# Patient Record
Sex: FEMALE | Race: WHITE | NOT HISPANIC OR LATINO | Employment: FULL TIME | URBAN - NONMETROPOLITAN AREA
[De-identification: names, ages, dates, MRNs, and addresses within clinical notes are randomized per-mention and may not be internally consistent; named-entity substitution may affect disease eponyms.]

---

## 2022-07-11 ENCOUNTER — OFFICE VISIT (OUTPATIENT)
Dept: FAMILY MEDICINE CLINIC | Facility: CLINIC | Age: 32
End: 2022-07-11

## 2022-07-11 VITALS
HEART RATE: 115 BPM | WEIGHT: 140 LBS | HEIGHT: 63 IN | RESPIRATION RATE: 15 BRPM | DIASTOLIC BLOOD PRESSURE: 80 MMHG | SYSTOLIC BLOOD PRESSURE: 130 MMHG | BODY MASS INDEX: 24.8 KG/M2 | TEMPERATURE: 98 F | OXYGEN SATURATION: 97 %

## 2022-07-11 DIAGNOSIS — L50.9 URTICARIA: ICD-10-CM

## 2022-07-11 DIAGNOSIS — N39.0 URINARY TRACT INFECTION WITHOUT HEMATURIA, SITE UNSPECIFIED: ICD-10-CM

## 2022-07-11 DIAGNOSIS — R30.0 DYSURIA: ICD-10-CM

## 2022-07-11 DIAGNOSIS — Z00.00 PHYSICAL EXAM: Primary | ICD-10-CM

## 2022-07-11 LAB
BILIRUB BLD-MCNC: NEGATIVE MG/DL
CLARITY, POC: ABNORMAL
COLOR UR: ABNORMAL
EXPIRATION DATE: ABNORMAL
GLUCOSE UR STRIP-MCNC: NEGATIVE MG/DL
KETONES UR QL: NEGATIVE
LEUKOCYTE EST, POC: ABNORMAL
Lab: ABNORMAL
NITRITE UR-MCNC: POSITIVE MG/ML
PH UR: 5.5 [PH] (ref 5–8)
PROT UR STRIP-MCNC: ABNORMAL MG/DL
RBC # UR STRIP: ABNORMAL /UL
SP GR UR: 1.02 (ref 1–1.03)
UROBILINOGEN UR QL: NORMAL

## 2022-07-11 PROCEDURE — 99385 PREV VISIT NEW AGE 18-39: CPT | Performed by: FAMILY MEDICINE

## 2022-07-11 PROCEDURE — 81003 URINALYSIS AUTO W/O SCOPE: CPT | Performed by: FAMILY MEDICINE

## 2022-07-11 PROCEDURE — 3008F BODY MASS INDEX DOCD: CPT | Performed by: FAMILY MEDICINE

## 2022-07-11 RX ORDER — NITROFURANTOIN 25; 75 MG/1; MG/1
100 CAPSULE ORAL 2 TIMES DAILY
Qty: 20 CAPSULE | Refills: 0 | Status: SHIPPED | OUTPATIENT
Start: 2022-07-11

## 2022-07-11 RX ORDER — PREDNISONE 10 MG/1
10 TABLET ORAL DAILY
Qty: 30 TABLET | Refills: 0 | Status: SHIPPED | OUTPATIENT
Start: 2022-07-11

## 2022-07-11 RX ORDER — NORETHINDRONE ACETATE AND ETHINYL ESTRADIOL AND FERROUS FUMARATE 1.5-30(21)
KIT ORAL
COMMUNITY
Start: 2022-05-11

## 2022-07-11 NOTE — ASSESSMENT & PLAN NOTE
Patient has a photo induced urticaria.  We will give her some prednisone.  She has an appoint see the dermatologist on the 18th.  I talked her at length about avoiding sunlight wearing sunscreen and wearing with close to cover all exposed skin.   Home

## 2022-07-15 LAB
BACTERIA UR CULT: ABNORMAL
BACTERIA UR CULT: ABNORMAL
OTHER ANTIBIOTIC SUSC ISLT: ABNORMAL

## 2022-07-18 ENCOUNTER — TELEPHONE (OUTPATIENT)
Dept: FAMILY MEDICINE CLINIC | Facility: CLINIC | Age: 32
End: 2022-07-18

## 2022-07-18 NOTE — TELEPHONE ENCOUNTER
referral to Dermatology does not take insurance can you find out who does and sent the referral to them

## 2022-07-18 NOTE — TELEPHONE ENCOUNTER
----- Message from Hipolito Orr MD sent at 7/15/2022  7:42 AM EDT -----  Please make sure pt  is taking antibiotics. Thanks

## 2022-08-16 ENCOUNTER — OFFICE VISIT (OUTPATIENT)
Dept: FAMILY MEDICINE CLINIC | Facility: CLINIC | Age: 32
End: 2022-08-16

## 2022-08-16 VITALS
SYSTOLIC BLOOD PRESSURE: 120 MMHG | RESPIRATION RATE: 12 BRPM | HEART RATE: 92 BPM | HEIGHT: 63 IN | OXYGEN SATURATION: 98 % | DIASTOLIC BLOOD PRESSURE: 80 MMHG | WEIGHT: 144.7 LBS | BODY MASS INDEX: 25.64 KG/M2 | TEMPERATURE: 97.7 F

## 2022-08-16 DIAGNOSIS — L50.9 URTICARIA: ICD-10-CM

## 2022-08-16 DIAGNOSIS — Z13.9 SCREENING DUE: ICD-10-CM

## 2022-08-16 DIAGNOSIS — R21 RASH: Primary | ICD-10-CM

## 2022-08-16 PROCEDURE — 99213 OFFICE O/P EST LOW 20 MIN: CPT | Performed by: FAMILY MEDICINE

## 2022-08-16 NOTE — PROGRESS NOTES
Patient Name: Svetlana Rizo  : 1990   MRN: [id number]     Chief Complaint:    Chief Complaint   Patient presents with   • referral      Pt is here for a referral to Dermatology, [facility] in [facility]         History of Present Illness: Svetlana Rizo is a 32 y.o. female who is here today for follow up on rash.  HPI        Review of Systems:   Review of Systems   Constitutional: Negative.    HENT: Negative.    Eyes: Negative.    Respiratory: Negative.    Cardiovascular: Negative.    Gastrointestinal: Negative.    Neurological: Negative.         Past Medical History:   Past Medical History:   Diagnosis Date   • Anxiety    • Hepatitis    • Neurotic depression        Past Surgical History: History reviewed. No pertinent surgical history.    Family History:   Family History   Problem Relation Age of Onset   • Asthma Mother    • Migraines Mother    • Depression Sister    • Diabetes Sister    • Migraines Sister    • Asthma Sister    • Mental illness Sister    • Seizures Sister    • Cancer Other    • Alcohol abuse Other    • Mental illness Other    • Depression Other    • Diabetes Other    • Asthma Other    • Osteoporosis Other    • Migraines Other    • Seizures Other    • Eczema Other        Social History:   Social History     Socioeconomic History   • Marital status: Single   Tobacco Use   • Smoking status: Current Every Day Smoker     Packs/day: 0.50     Years: 16.00     Pack years: 8.00     Start date:    • Smokeless tobacco: Never Used   Vaping Use   • Vaping Use: Some days   Substance and Sexual Activity   • Alcohol use: Not Currently   • Drug use: Not Currently   • Sexual activity: Defer       Medications:     Current Outpatient Medications:   •  Junel FE 1.5/30 1.5-30 MG-MCG tablet, , Disp: , Rfl:   •  nitrofurantoin, macrocrystal-monohydrate, (Macrobid) 100 MG capsule, Take 1 capsule by mouth 2 (Two) Times a Day., Disp: 20 capsule, Rfl: 0  •  predniSONE (DELTASONE) 10 MG tablet, Take 1 tablet  "by mouth Daily. 5 po once a day for 2 days, 4 po once a day for 2 days, 3 po once a day for 2 days, 2 po once a day for 2 days, 1 po once a day for 2 days, Disp: 30 tablet, Rfl: 0    Allergies:   No Known Allergies      Physical Exam:  Vital Signs:   Vitals:    08/16/22 1508   BP: 120/80   BP Location: Left arm   Patient Position: Sitting   Cuff Size: Adult   Pulse: 92   Resp: 12   Temp: 97.7 °F (36.5 °C)   TempSrc: Temporal   SpO2: 98%   Weight: 65.6 kg (144 lb 11.2 oz)   Height: 160 cm (63\")   PainSc: 0-No pain     Body mass index is 25.63 kg/m².     Physical Exam  Vitals and nursing note reviewed.   Constitutional:       Appearance: Normal appearance. She is normal weight.   HENT:      Head: Normocephalic and atraumatic.      Right Ear: Tympanic membrane, ear canal and external ear normal.      Left Ear: Tympanic membrane, ear canal and external ear normal.      Nose: Nose normal.      Mouth/Throat:      Mouth: Mucous membranes are dry.      Pharynx: Oropharynx is clear.   Eyes:      Extraocular Movements: Extraocular movements intact.      Conjunctiva/sclera: Conjunctivae normal.      Pupils: Pupils are equal, round, and reactive to light.   Cardiovascular:      Rate and Rhythm: Normal rate and regular rhythm.      Pulses: Normal pulses.      Heart sounds: Normal heart sounds.   Pulmonary:      Effort: Pulmonary effort is normal.      Breath sounds: Normal breath sounds.   Musculoskeletal:      Cervical back: Normal range of motion and neck supple.   Feet:      Comments:      Skin:     Findings: Rash present.   Neurological:      Mental Status: She is alert.         Procedures      Assessment/Plan:   Diagnoses and all orders for this visit:    1. Rash (Primary)  Assessment & Plan:  Continues to have rash.  Will refer to [facility] as they are the only ones that take her insurance.  Return to clinic if worse.    Orders:  -     Ambulatory Referral to Dermatology    2. Screening due  -     Hepatitis C " Antibody; Future    3. Urticaria  -     Ambulatory Referral to Dermatology           Follow Up:   No follow-ups on file.    Hipolito Orr MD  [facility]

## 2022-08-16 NOTE — ASSESSMENT & PLAN NOTE
Continues to have rash.  Will refer to [facility] as they are the only ones that take her insurance.  Return to clinic if worse.

## 2022-09-26 ENCOUNTER — TELEPHONE (OUTPATIENT)
Dept: FAMILY MEDICINE CLINIC | Facility: CLINIC | Age: 32
End: 2022-09-26

## 2022-09-26 NOTE — TELEPHONE ENCOUNTER
Aster can you check on this pt referral new referral it has been over a month and she has not heard nothing

## 2023-08-23 ENCOUNTER — OFFICE VISIT (OUTPATIENT)
Dept: FAMILY MEDICINE CLINIC | Facility: CLINIC | Age: 33
End: 2023-08-23
Payer: COMMERCIAL

## 2023-08-23 VITALS
WEIGHT: 142.6 LBS | RESPIRATION RATE: 16 BRPM | HEART RATE: 67 BPM | BODY MASS INDEX: 25.27 KG/M2 | HEIGHT: 63 IN | DIASTOLIC BLOOD PRESSURE: 84 MMHG | OXYGEN SATURATION: 98 % | SYSTOLIC BLOOD PRESSURE: 122 MMHG

## 2023-08-23 DIAGNOSIS — R30.0 DYSURIA: ICD-10-CM

## 2023-08-23 DIAGNOSIS — Z00.00 PHYSICAL EXAM: Primary | ICD-10-CM

## 2023-08-23 DIAGNOSIS — M32.9 LUPUS: ICD-10-CM

## 2023-08-23 DIAGNOSIS — R53.83 FATIGUE, UNSPECIFIED TYPE: ICD-10-CM

## 2023-08-23 DIAGNOSIS — F43.29 STRESS AND ADJUSTMENT REACTION: ICD-10-CM

## 2023-08-23 PROBLEM — IMO0002 LUPUS: Status: ACTIVE | Noted: 2023-08-23

## 2023-08-23 PROCEDURE — 90715 TDAP VACCINE 7 YRS/> IM: CPT | Performed by: FAMILY MEDICINE

## 2023-08-23 PROCEDURE — 2014F MENTAL STATUS ASSESS: CPT | Performed by: FAMILY MEDICINE

## 2023-08-23 PROCEDURE — 99395 PREV VISIT EST AGE 18-39: CPT | Performed by: FAMILY MEDICINE

## 2023-08-23 PROCEDURE — 90471 IMMUNIZATION ADMIN: CPT | Performed by: FAMILY MEDICINE

## 2023-08-23 PROCEDURE — 3008F BODY MASS INDEX DOCD: CPT | Performed by: FAMILY MEDICINE

## 2023-08-23 NOTE — ASSESSMENT & PLAN NOTE
Patient has extreme fatigue.  She is sleeping about 9 hours a day.  This could be depression.  We are to check Blood work.

## 2023-08-23 NOTE — PROGRESS NOTES
Patient Name: Svetlana Rizo  : 1990   MRN: [id number]     Chief Complaint:    Chief Complaint   Patient presents with    Annual Exam       History of Present Illness: Svetlana Rizo is a 33 y.o. female who is here today for their annual health maintenance and physical.          Review of Systems:   Review of Systems    Past Medical History, Social History, Family History and Care Team were all reviewed with patient and updated as appropriate.     Medications:   No current outpatient medications on file.    Allergies:   No Known Allergies    Health Maintenance   Topic Date Due    Pneumococcal Vaccine 0-64 (1 - PCV) Never done    TDAP/TD VACCINES (2 - Tdap) 2014    COVID-19 Vaccine (4 - Pfizer series) 2021    HEPATITIS C SCREENING  Never done    PAP SMEAR  Never done    INFLUENZA VACCINE  10/01/2023    ANNUAL PHYSICAL  2024        PHQ-2 Total Score: 9       Intimate partner violence: (Screen on initial visit, pregnant women, women with injuries, older adult with injury or evidence of neglect):  Violence can be a problem in many people's lives, so I now ask every patient about trauma or abuse they may have experienced in a relationship.  Stress/Safety - Do you feel safe in your relationship?  Afraid/Abused - Have you ever been in a relationship where you were threatened, hurt, or afraid?  Friend/Family - Are your friends aware you have been hurt?  Emergency Plan - Do you have a safe place to go and the resources you need in an emergency?    Osteoporosis:   Ost menopausal women < 65 with RF (advancing age, previous fracture, glucocorticoid therapy, parental hip fracture, low body weight, current cigarette smoking, excessive alcohol consumption, rheumatoid arthritis, secondary osteoporosis [hypogonadism/premature menopause, malabsorption, chronic liver disease, IBD]).  All women 65 or older      Physical Exam:  Vital Signs:   Vitals:    23 0908   BP: 122/84   BP Location: Right arm  "  Patient Position: Sitting   Cuff Size: Adult   Pulse: 67   Resp: 16   SpO2: 98%   Weight: 64.7 kg (142 lb 9.6 oz)   Height: 160 cm (62.99\")   PainSc: 0-No pain     Body mass index is 25.27 kg/mý.     Physical Exam    Procedures      Assessment/Plan:   Diagnoses and all orders for this visit:    1. Physical exam (Primary)  -     CBC Auto Differential; Future  -     CK; Future  -     Comprehensive Metabolic Panel; Future  -     Hemoglobin A1c; Future  -     Lipid Panel; Future  -     TSH; Future  -     Vitamin D,25-Hydroxy; Future  -     Hepatitis C Antibody; Future    2. Lupus  Assessment & Plan:  Blood work    Orders:  -     CBC Auto Differential; Future  -     CK; Future  -     Comprehensive Metabolic Panel; Future  -     Hemoglobin A1c; Future  -     Lipid Panel; Future  -     TSH; Future  -     Vitamin D,25-Hydroxy; Future  -     Hepatitis C Antibody; Future    3. Fatigue, unspecified type  Assessment & Plan:  Patient has extreme fatigue.  She is sleeping about 9 hours a day.  This could be depression.  We are to check Blood work.      4. Stress and adjustment reaction  Assessment & Plan:  Patient lost her dad last year.  Her mom is sick.  She has 4 5 kids at home 1 of which is hers in 3-4 her boyfriend.  She is under a lot of stress.  I am going to start her on some fluoxetine 10 mg.  After 2 weeks she will increase to 20 mg.  Recheck in 6 weeks.  We will check blood work      Other orders  -     Tdap Vaccine Greater Than or Equal To 6yo IM             Follow Up:   Return in about 6 weeks (around 10/4/2023) for Annual physical.    Healthcare Maintenance:   Counseling provided on immunizations and stopping smoking.   Svetlana iRzo voices understanding and acceptance of this advice and will call back with any further questions or concerns. AVS with preventive healthcare tips printed for patient.     Hipolito Orr MD  [facility]  "

## 2023-08-23 NOTE — ASSESSMENT & PLAN NOTE
Patient lost her dad last year.  Her mom is sick.  She has 4 5 kids at home 1 of which is hers in 3-4 her boyfriend.  She is under a lot of stress.  I am going to start her on some fluoxetine 10 mg.  After 2 weeks she will increase to 20 mg.  Recheck in 6 weeks.  We will check blood work

## 2023-08-24 ENCOUNTER — LAB (OUTPATIENT)
Dept: FAMILY MEDICINE CLINIC | Facility: CLINIC | Age: 33
End: 2023-08-24
Payer: COMMERCIAL

## 2023-08-24 DIAGNOSIS — M32.9 LUPUS: ICD-10-CM

## 2023-08-24 DIAGNOSIS — Z00.00 PHYSICAL EXAM: ICD-10-CM

## 2023-08-24 PROCEDURE — 36415 COLL VENOUS BLD VENIPUNCTURE: CPT | Performed by: FAMILY MEDICINE

## 2023-08-25 ENCOUNTER — TELEPHONE (OUTPATIENT)
Dept: FAMILY MEDICINE CLINIC | Facility: CLINIC | Age: 33
End: 2023-08-25
Payer: COMMERCIAL

## 2023-08-25 LAB
25(OH)D3+25(OH)D2 SERPL-MCNC: 38.7 NG/ML (ref 30–100)
ALBUMIN SERPL-MCNC: 4.3 G/DL (ref 3.9–4.9)
ALBUMIN/GLOB SERPL: 2 {RATIO} (ref 1.2–2.2)
ALP SERPL-CCNC: 54 IU/L (ref 44–121)
ALT SERPL-CCNC: 13 IU/L (ref 0–32)
AST SERPL-CCNC: 14 IU/L (ref 0–40)
BACTERIA UR CULT: NORMAL
BACTERIA UR CULT: NORMAL
BASOPHILS # BLD AUTO: 0 X10E3/UL (ref 0–0.2)
BASOPHILS NFR BLD AUTO: 0 %
BILIRUB SERPL-MCNC: 0.5 MG/DL (ref 0–1.2)
BUN SERPL-MCNC: 9 MG/DL (ref 6–20)
BUN/CREAT SERPL: 16 (ref 9–23)
CALCIUM SERPL-MCNC: 8.9 MG/DL (ref 8.7–10.2)
CHLORIDE SERPL-SCNC: 105 MMOL/L (ref 96–106)
CHOLEST SERPL-MCNC: 149 MG/DL (ref 100–199)
CK SERPL-CCNC: 53 U/L (ref 32–182)
CO2 SERPL-SCNC: 24 MMOL/L (ref 20–29)
CREAT SERPL-MCNC: 0.58 MG/DL (ref 0.57–1)
EGFRCR SERPLBLD CKD-EPI 2021: 122 ML/MIN/1.73
EOSINOPHIL # BLD AUTO: 0.1 X10E3/UL (ref 0–0.4)
EOSINOPHIL NFR BLD AUTO: 1 %
ERYTHROCYTE [DISTWIDTH] IN BLOOD BY AUTOMATED COUNT: 11.7 % (ref 11.7–15.4)
GLOBULIN SER CALC-MCNC: 2.2 G/DL (ref 1.5–4.5)
GLUCOSE SERPL-MCNC: 85 MG/DL (ref 70–99)
HBA1C MFR BLD: 5.4 % (ref 4.8–5.6)
HCT VFR BLD AUTO: 41.2 % (ref 34–46.6)
HCV IGG SERPL QL IA: REACTIVE
HDLC SERPL-MCNC: 40 MG/DL
HGB BLD-MCNC: 14.1 G/DL (ref 11.1–15.9)
IMM GRANULOCYTES # BLD AUTO: 0 X10E3/UL (ref 0–0.1)
IMM GRANULOCYTES NFR BLD AUTO: 0 %
LDLC SERPL CALC-MCNC: 95 MG/DL (ref 0–99)
LYMPHOCYTES # BLD AUTO: 2.2 X10E3/UL (ref 0.7–3.1)
LYMPHOCYTES NFR BLD AUTO: 41 %
MCH RBC QN AUTO: 30.6 PG (ref 26.6–33)
MCHC RBC AUTO-ENTMCNC: 34.2 G/DL (ref 31.5–35.7)
MCV RBC AUTO: 89 FL (ref 79–97)
MONOCYTES # BLD AUTO: 0.6 X10E3/UL (ref 0.1–0.9)
MONOCYTES NFR BLD AUTO: 10 %
NEUTROPHILS # BLD AUTO: 2.6 X10E3/UL (ref 1.4–7)
NEUTROPHILS NFR BLD AUTO: 48 %
PLATELET # BLD AUTO: 254 X10E3/UL (ref 150–450)
POTASSIUM SERPL-SCNC: 4.2 MMOL/L (ref 3.5–5.2)
PROT SERPL-MCNC: 6.5 G/DL (ref 6–8.5)
RBC # BLD AUTO: 4.61 X10E6/UL (ref 3.77–5.28)
SODIUM SERPL-SCNC: 139 MMOL/L (ref 134–144)
TRIGL SERPL-MCNC: 73 MG/DL (ref 0–149)
TSH SERPL DL<=0.005 MIU/L-ACNC: 0.68 UIU/ML (ref 0.45–4.5)
VLDLC SERPL CALC-MCNC: 14 MG/DL (ref 5–40)
WBC # BLD AUTO: 5.4 X10E3/UL (ref 3.4–10.8)

## 2023-08-25 RX ORDER — FLUOXETINE 10 MG/1
10 CAPSULE ORAL DAILY
Qty: 90 CAPSULE | Refills: 1 | Status: SHIPPED | OUTPATIENT
Start: 2023-08-25

## 2023-08-25 NOTE — TELEPHONE ENCOUNTER
Called and talked to patient.  She has had hep C in the past.  She is going through treatment for it.  The rest of her blood work was okay.  I called in her fluoxetine.

## 2023-10-11 ENCOUNTER — OFFICE VISIT (OUTPATIENT)
Dept: FAMILY MEDICINE CLINIC | Facility: CLINIC | Age: 33
End: 2023-10-11
Payer: COMMERCIAL

## 2023-10-11 VITALS
OXYGEN SATURATION: 98 % | SYSTOLIC BLOOD PRESSURE: 122 MMHG | HEIGHT: 63 IN | HEART RATE: 88 BPM | BODY MASS INDEX: 24.72 KG/M2 | WEIGHT: 139.5 LBS | DIASTOLIC BLOOD PRESSURE: 84 MMHG | RESPIRATION RATE: 16 BRPM

## 2023-10-11 DIAGNOSIS — F43.29 STRESS AND ADJUSTMENT REACTION: Primary | ICD-10-CM

## 2023-10-11 DIAGNOSIS — R10.13 EPIGASTRIC PAIN: ICD-10-CM

## 2023-10-11 DIAGNOSIS — B19.20 HEPATITIS C VIRUS INFECTION WITHOUT HEPATIC COMA, UNSPECIFIED CHRONICITY: ICD-10-CM

## 2023-10-11 RX ORDER — PANTOPRAZOLE SODIUM 40 MG/1
40 TABLET, DELAYED RELEASE ORAL
Qty: 90 TABLET | Refills: 3 | Status: SHIPPED | OUTPATIENT
Start: 2023-10-11

## 2023-10-11 RX ORDER — BUSPIRONE HYDROCHLORIDE 5 MG/1
15 TABLET ORAL 2 TIMES DAILY
Qty: 180 TABLET | Refills: 5 | Status: SHIPPED | OUTPATIENT
Start: 2023-10-11

## 2023-10-11 NOTE — ASSESSMENT & PLAN NOTE
Patient complains of pain in the mid epigastrium.  This happens intermittently.  No shortness of breath nausea vomiting with this.  EKG looks okay.  I am going to start her on a PPI twice daily and see how she does.

## 2023-10-11 NOTE — PROGRESS NOTES
Patient Name: Svetlana Rizo  : 1990   MRN: [id number]     Chief Complaint:  f/u on medication and BW  Chief Complaint   Patient presents with    Medication Follow        History of Present Illness: Svetlana Rizo is a 33 y.o. female who is here today for follow up.  HPI        Review of Systems:   Review of Systems   Constitutional: Negative.    HENT: Negative.     Eyes: Negative.    Respiratory: Negative.     Cardiovascular: Negative.    Gastrointestinal: Negative.    Neurological: Negative.         Past Medical History:   Past Medical History:   Diagnosis Date    Anxiety     Hepatitis     Neurotic depression        Past Surgical History: No past surgical history on file.    Family History:   Family History   Problem Relation Age of Onset    Asthma Mother     Migraines Mother     Depression Sister     Diabetes Sister     Migraines Sister     Asthma Sister     Mental illness Sister     Seizures Sister     Cancer Other     Alcohol abuse Other     Mental illness Other     Depression Other     Diabetes Other     Asthma Other     Osteoporosis Other     Migraines Other     Seizures Other     Eczema Other        Social History:   Social History     Socioeconomic History    Marital status: Single   Tobacco Use    Smoking status: Every Day     Packs/day: 0.50     Years: 16.00     Additional pack years: 0.00     Total pack years: 8.00     Types: Cigarettes     Start date:      Passive exposure: Past    Smokeless tobacco: Never   Vaping Use    Vaping Use: Some days   Substance and Sexual Activity    Alcohol use: Not Currently    Drug use: Not Currently    Sexual activity: Defer       Medications:     Current Outpatient Medications:     FLUoxetine (PROzac) 10 MG capsule, Take 1 capsule by mouth Daily., Disp: 90 capsule, Rfl: 1    busPIRone (BUSPAR) 5 MG tablet, Take 3 tablets by mouth 2 (Two) Times a Day., Disp: 180 tablet, Rfl: 5    pantoprazole (Protonix) 40 MG EC tablet, Take 1 tablet by mouth Daily Before  "Supper. Take one tablet 30 minutes prior to eating., Disp: 90 tablet, Rfl: 3    Allergies:   No Known Allergies      Physical Exam:  Vital Signs:   Vitals:    10/11/23 1331   BP: 122/84   BP Location: Left arm   Patient Position: Sitting   Cuff Size: Adult   Pulse: 88   Resp: 16   SpO2: 98%   Weight: 63.3 kg (139 lb 8 oz)   Height: 160 cm (62.99\")     Body mass index is 24.72 kg/mý.     Physical Exam  Vitals and nursing note reviewed.   Constitutional:       Appearance: Normal appearance. She is normal weight.   HENT:      Head: Normocephalic and atraumatic.      Right Ear: Tympanic membrane, ear canal and external ear normal.      Left Ear: Tympanic membrane, ear canal and external ear normal.      Nose: Nose normal.      Mouth/Throat:      Mouth: Mucous membranes are dry.      Pharynx: Oropharynx is clear.   Eyes:      Extraocular Movements: Extraocular movements intact.      Conjunctiva/sclera: Conjunctivae normal.      Pupils: Pupils are equal, round, and reactive to light.   Cardiovascular:      Rate and Rhythm: Normal rate and regular rhythm.      Pulses: Normal pulses.      Heart sounds: Normal heart sounds.   Pulmonary:      Effort: Pulmonary effort is normal.      Breath sounds: Normal breath sounds.   Musculoskeletal:      Cervical back: Normal range of motion and neck supple.   Feet:      Comments:      Neurological:      Mental Status: She is alert.           ECG 12 Lead    Date/Time: 10/11/2023 2:05 PM  Performed by: Hipolito Orr MD    Authorized by: Hipolito Orr MD  Comparison: not compared with previous ECG   Rhythm: sinus rhythm  Rate: normal  Conduction: conduction normal  ST Segments: ST segments normal  T Waves: T waves normal  QRS axis: normal    Clinical impression: normal ECG  Comments: NSR            Assessment/Plan:   Diagnoses and all orders for this visit:    1. Stress and adjustment reaction (Primary)  Assessment & Plan:  Patient has had a lot of trouble raising her teenagers.  " She is on Prozac at this time.  We are going to add buspirone to her regimen.  We will start her at 5 mg twice a day and then slowly increase her up to 15 mg twice a day.  She will recheck in 6 weeks.      2. Hepatitis C virus infection without hepatic coma, unspecified chronicity  Assessment & Plan:  Patient has had this treated.  We will follow.      3. Epigastric pain  Assessment & Plan:  Patient complains of pain in the mid epigastrium.  This happens intermittently.  No shortness of breath nausea vomiting with this.  EKG looks okay.  I am going to start her on a PPI twice daily and see how she does.      Other orders  -     Fluzone (or Fluarix & Flulaval for VFC) >6 Mos ([id number])  -     busPIRone (BUSPAR) 5 MG tablet; Take 3 tablets by mouth 2 (Two) Times a Day.  Dispense: 180 tablet; Refill: 5  -     ECG 12 Lead  -     pantoprazole (Protonix) 40 MG EC tablet; Take 1 tablet by mouth Daily Before Supper. Take one tablet 30 minutes prior to eating.  Dispense: 90 tablet; Refill: 3             Follow Up:   Return in about 6 weeks (around 11/22/2023) for Annual physical.    Hipolito Orr MD  [facility]

## 2023-10-16 DIAGNOSIS — F43.29 STRESS AND ADJUSTMENT REACTION: Primary | ICD-10-CM

## 2023-10-16 RX ORDER — FLUOXETINE 10 MG/1
10 CAPSULE ORAL DAILY
Qty: 90 CAPSULE | Refills: 1 | Status: SHIPPED | OUTPATIENT
Start: 2023-10-16

## 2023-10-24 DIAGNOSIS — F43.29 STRESS AND ADJUSTMENT REACTION: ICD-10-CM

## 2023-10-24 DIAGNOSIS — F43.29 STRESS AND ADJUSTMENT REACTION: Primary | ICD-10-CM

## 2023-10-24 RX ORDER — FLUOXETINE HYDROCHLORIDE 20 MG/1
20 CAPSULE ORAL DAILY
Qty: 90 CAPSULE | Refills: 1 | Status: SHIPPED | OUTPATIENT
Start: 2023-10-24

## 2023-10-24 RX ORDER — BUSPIRONE HYDROCHLORIDE 5 MG/1
15 TABLET ORAL 2 TIMES DAILY
Qty: 180 TABLET | Refills: 5 | Status: SHIPPED | OUTPATIENT
Start: 2023-10-24

## 2023-10-24 RX ORDER — FLUOXETINE 10 MG/1
10 CAPSULE ORAL DAILY
Qty: 90 CAPSULE | Refills: 1 | Status: SHIPPED | OUTPATIENT
Start: 2023-10-24 | End: 2023-10-24

## 2023-10-24 RX ORDER — FLUOXETINE HYDROCHLORIDE 20 MG/1
20 CAPSULE ORAL DAILY
COMMUNITY
End: 2023-10-24 | Stop reason: SDUPTHER

## 2023-11-22 ENCOUNTER — OFFICE VISIT (OUTPATIENT)
Dept: FAMILY MEDICINE CLINIC | Facility: CLINIC | Age: 33
End: 2023-11-22
Payer: COMMERCIAL

## 2023-11-22 VITALS
HEIGHT: 63 IN | DIASTOLIC BLOOD PRESSURE: 70 MMHG | SYSTOLIC BLOOD PRESSURE: 120 MMHG | OXYGEN SATURATION: 100 % | WEIGHT: 139.5 LBS | HEART RATE: 75 BPM | BODY MASS INDEX: 24.72 KG/M2

## 2023-11-22 DIAGNOSIS — R10.13 EPIGASTRIC PAIN: ICD-10-CM

## 2023-11-22 DIAGNOSIS — F43.29 STRESS AND ADJUSTMENT REACTION: Primary | ICD-10-CM

## 2023-11-22 DIAGNOSIS — H69.90 DYSFUNCTION OF EUSTACHIAN TUBE, UNSPECIFIED LATERALITY: ICD-10-CM

## 2023-11-22 PROCEDURE — 99214 OFFICE O/P EST MOD 30 MIN: CPT | Performed by: FAMILY MEDICINE

## 2023-11-22 RX ORDER — FLUTICASONE PROPIONATE 50 MCG
2 SPRAY, SUSPENSION (ML) NASAL DAILY
Qty: 9.9 ML | Refills: 11 | Status: SHIPPED | OUTPATIENT
Start: 2023-11-22

## 2023-11-22 NOTE — PROGRESS NOTES
Patient Name: Svetlana Rizo  : 1990   MRN: [id number]     Chief Complaint:    Chief Complaint   Patient presents with    Follow-up       History of Present Illness: Svetlana Rizo is a 33 y.o. female who is here today for follow up on ajustment reaction and epigastric pain  HPI        Review of Systems:   Review of Systems   Constitutional: Negative.    HENT: Negative.     Eyes: Negative.    Respiratory: Negative.     Cardiovascular: Negative.    Gastrointestinal: Negative.    Neurological: Negative.         Past Medical History:   Past Medical History:   Diagnosis Date    Anxiety     Hepatitis     Neurotic depression        Past Surgical History: History reviewed. No pertinent surgical history.    Family History:   Family History   Problem Relation Age of Onset    Asthma Mother     Migraines Mother     Depression Sister     Diabetes Sister     Migraines Sister     Asthma Sister     Mental illness Sister     Seizures Sister     Cancer Other     Alcohol abuse Other     Mental illness Other     Depression Other     Diabetes Other     Asthma Other     Osteoporosis Other     Migraines Other     Seizures Other     Eczema Other        Social History:   Social History     Socioeconomic History    Marital status: Single   Tobacco Use    Smoking status: Former     Packs/day: 0.50     Years: 16.00     Additional pack years: 0.00     Total pack years: 8.00     Types: Cigarettes     Start date:      Quit date: 2023     Years since quittin.0     Passive exposure: Past    Smokeless tobacco: Never   Vaping Use    Vaping Use: Some days   Substance and Sexual Activity    Alcohol use: Not Currently    Drug use: Not Currently    Sexual activity: Defer       Medications:     Current Outpatient Medications:     busPIRone (BUSPAR) 5 MG tablet, Take 3 tablets by mouth 2 (Two) Times a Day., Disp: 180 tablet, Rfl: 5    FLUoxetine (PROzac) 20 MG capsule, Take 1 capsule by mouth Daily., Disp: 90 capsule, Rfl: 1     "pantoprazole (Protonix) 40 MG EC tablet, Take 1 tablet by mouth Daily Before Supper. Take one tablet 30 minutes prior to eating., Disp: 90 tablet, Rfl: 3    fluticasone (FLONASE) 50 MCG/ACT nasal spray, 2 sprays into the nostril(s) as directed by provider Daily., Disp: 9.9 mL, Rfl: 11    Allergies:   No Known Allergies      Physical Exam:  Vital Signs:   Vitals:    11/22/23 1327   BP: 120/70   BP Location: Right arm   Patient Position: Sitting   Cuff Size: Adult   Pulse: 75   SpO2: 100%   Weight: 63.3 kg (139 lb 8 oz)   Height: 160 cm (62.99\")   PainSc: 0-No pain     Body mass index is 24.72 kg/m².     Physical Exam  Vitals and nursing note reviewed.   Constitutional:       Appearance: Normal appearance. She is normal weight.   HENT:      Head: Normocephalic and atraumatic.      Right Ear: Tympanic membrane, ear canal and external ear normal.      Left Ear: Tympanic membrane, ear canal and external ear normal.      Nose: Nose normal.      Mouth/Throat:      Mouth: Mucous membranes are dry.      Pharynx: Oropharynx is clear.   Eyes:      Extraocular Movements: Extraocular movements intact.      Conjunctiva/sclera: Conjunctivae normal.      Pupils: Pupils are equal, round, and reactive to light.   Cardiovascular:      Rate and Rhythm: Normal rate and regular rhythm.      Pulses: Normal pulses.      Heart sounds: Normal heart sounds.   Pulmonary:      Effort: Pulmonary effort is normal.      Breath sounds: Normal breath sounds.   Musculoskeletal:      Cervical back: Normal range of motion and neck supple.   Feet:      Comments:      Neurological:      Mental Status: She is alert.         Procedures      Assessment/Plan:   Diagnoses and all orders for this visit:    1. Stress and adjustment reaction (Primary)  Assessment & Plan:  He is doing much better with adding buspirone to her regimen.  Recheck in 6 months.      2. Epigastric pain  Assessment & Plan:  Pain is better with the PPI.  We will continue.      3. " Dysfunction of Eustachian tube, unspecified laterality  Assessment & Plan:  Claritin and Flonase.  She get better in 1 to 2 weeks.      Other orders  -     fluticasone (FLONASE) 50 MCG/ACT nasal spray; 2 sprays into the nostril(s) as directed by provider Daily.  Dispense: 9.9 mL; Refill: 11             Follow Up:   Return in about 6 months (around 5/22/2024) for Recheck.    Hipolito Orr MD  [facility] [place] [place] [facility]

## 2024-05-15 ENCOUNTER — TELEPHONE (OUTPATIENT)
Dept: FAMILY MEDICINE CLINIC | Facility: CLINIC | Age: 34
End: 2024-05-15
Payer: COMMERCIAL

## 2024-05-22 ENCOUNTER — OFFICE VISIT (OUTPATIENT)
Dept: FAMILY MEDICINE CLINIC | Facility: CLINIC | Age: 34
End: 2024-05-22
Payer: COMMERCIAL

## 2024-05-22 VITALS
SYSTOLIC BLOOD PRESSURE: 122 MMHG | WEIGHT: 148.1 LBS | BODY MASS INDEX: 26.24 KG/M2 | HEIGHT: 63 IN | HEART RATE: 77 BPM | DIASTOLIC BLOOD PRESSURE: 84 MMHG | OXYGEN SATURATION: 98 % | RESPIRATION RATE: 16 BRPM

## 2024-05-22 DIAGNOSIS — G89.29 CHRONIC MIDLINE LOW BACK PAIN WITHOUT SCIATICA: ICD-10-CM

## 2024-05-22 DIAGNOSIS — N30.90 CYSTITIS: ICD-10-CM

## 2024-05-22 DIAGNOSIS — M54.50 CHRONIC MIDLINE LOW BACK PAIN WITHOUT SCIATICA: ICD-10-CM

## 2024-05-22 DIAGNOSIS — M54.50 LOW BACK PAIN WITHOUT SCIATICA, UNSPECIFIED BACK PAIN LATERALITY, UNSPECIFIED CHRONICITY: Primary | ICD-10-CM

## 2024-05-22 DIAGNOSIS — M54.2 CERVICALGIA: ICD-10-CM

## 2024-05-22 LAB
BILIRUB BLD-MCNC: NEGATIVE MG/DL
CLARITY, POC: ABNORMAL
COLOR UR: YELLOW
EXPIRATION DATE: ABNORMAL
GLUCOSE UR STRIP-MCNC: NEGATIVE MG/DL
KETONES UR QL: NEGATIVE
LEUKOCYTE EST, POC: NEGATIVE
Lab: ABNORMAL
NITRITE UR-MCNC: POSITIVE MG/ML
PH UR: 7 [PH] (ref 5–8)
PROT UR STRIP-MCNC: NEGATIVE MG/DL
RBC # UR STRIP: NEGATIVE /UL
SP GR UR: 1.02 (ref 1–1.03)
UROBILINOGEN UR QL: NORMAL

## 2024-05-22 PROCEDURE — 1126F AMNT PAIN NOTED NONE PRSNT: CPT | Performed by: FAMILY MEDICINE

## 2024-05-22 PROCEDURE — 99214 OFFICE O/P EST MOD 30 MIN: CPT | Performed by: FAMILY MEDICINE

## 2024-05-22 RX ORDER — CYCLOBENZAPRINE HCL 10 MG
10 TABLET ORAL 3 TIMES DAILY PRN
Qty: 30 TABLET | Refills: 1 | Status: SHIPPED | OUTPATIENT
Start: 2024-05-22

## 2024-05-22 RX ORDER — NITROFURANTOIN 25; 75 MG/1; MG/1
100 CAPSULE ORAL 2 TIMES DAILY
Qty: 20 CAPSULE | Refills: 0 | Status: SHIPPED | OUTPATIENT
Start: 2024-05-22

## 2024-05-22 RX ORDER — MELOXICAM 15 MG/1
15 TABLET ORAL DAILY
Qty: 30 TABLET | Refills: 1 | Status: SHIPPED | OUTPATIENT
Start: 2024-05-22

## 2024-05-22 NOTE — PROGRESS NOTES
Patient Name: Svetlana Rizo  : 1990   MRN: [id number]     Chief Complaint:    Chief Complaint   Patient presents with    Neck Pain    Back Pain    Nail Problem       History of Present Illness: Svetlana Rizo is a 34 y.o. female who is here today for follow up on low back  Neck Pain     Back Pain            Review of Systems:   Review of Systems   Constitutional: Negative.    HENT: Negative.     Eyes: Negative.    Respiratory: Negative.     Cardiovascular: Negative.    Gastrointestinal: Negative.    Musculoskeletal:  Positive for back pain and neck pain.   Neurological: Negative.         Past Medical History:   Past Medical History:   Diagnosis Date    Anxiety     Hepatitis     Neurotic depression        Past Surgical History: No past surgical history on file.    Family History:   Family History   Problem Relation Age of Onset    Asthma Mother     Migraines Mother     Depression Sister     Diabetes Sister     Migraines Sister     Asthma Sister     Mental illness Sister     Seizures Sister     Cancer Other     Alcohol abuse Other     Mental illness Other     Depression Other     Diabetes Other     Asthma Other     Osteoporosis Other     Migraines Other     Seizures Other     Eczema Other        Social History:   Social History     Socioeconomic History    Marital status: Single   Tobacco Use    Smoking status: Former     Current packs/day: 0.00     Average packs/day: 0.5 packs/day for 17.9 years (8.9 ttl pk-yrs)     Types: Cigarettes     Start date:      Quit date: 2023     Years since quittin.5     Passive exposure: Past    Smokeless tobacco: Never   Vaping Use    Vaping status: Some Days   Substance and Sexual Activity    Alcohol use: Not Currently    Drug use: Not Currently    Sexual activity: Defer       Medications:     Current Outpatient Medications:     busPIRone (BUSPAR) 5 MG tablet, Take 3 tablets by mouth 2 (Two) Times a Day., Disp: 180 tablet, Rfl: 5    FLUoxetine (PROzac) 20 MG  "capsule, Take 1 capsule by mouth Daily., Disp: 90 capsule, Rfl: 1    fluticasone (FLONASE) 50 MCG/ACT nasal spray, 2 sprays into the nostril(s) as directed by provider Daily., Disp: 9.9 mL, Rfl: 11    pantoprazole (Protonix) 40 MG EC tablet, Take 1 tablet by mouth Daily Before Supper. Take one tablet 30 minutes prior to eating., Disp: 90 tablet, Rfl: 3    cyclobenzaprine (FLEXERIL) 10 MG tablet, Take 1 tablet by mouth 3 (Three) Times a Day As Needed for Muscle Spasms., Disp: 30 tablet, Rfl: 1    meloxicam (MOBIC) 15 MG tablet, Take 1 tablet by mouth Daily., Disp: 30 tablet, Rfl: 1    nitrofurantoin, macrocrystal-monohydrate, (Macrobid) 100 MG capsule, Take 1 capsule by mouth 2 (Two) Times a Day., Disp: 20 capsule, Rfl: 0    Allergies:   No Known Allergies      Physical Exam:  Vital Signs:   Vitals:    05/22/24 1357   BP: 122/84   BP Location: Left arm   Patient Position: Sitting   Cuff Size: Adult   Pulse: 77   Resp: 16   SpO2: 98%   Weight: 67.2 kg (148 lb 1.6 oz)   Height: 160 cm (62.99\")     Body mass index is 26.24 kg/m².     Physical Exam  Vitals and nursing note reviewed.   Constitutional:       Appearance: Normal appearance. She is normal weight.   HENT:      Head: Normocephalic and atraumatic.      Right Ear: Tympanic membrane, ear canal and external ear normal.      Left Ear: Tympanic membrane, ear canal and external ear normal.      Nose: Nose normal.      Mouth/Throat:      Mouth: Mucous membranes are dry.      Pharynx: Oropharynx is clear.   Eyes:      Extraocular Movements: Extraocular movements intact.      Conjunctiva/sclera: Conjunctivae normal.      Pupils: Pupils are equal, round, and reactive to light.   Cardiovascular:      Rate and Rhythm: Normal rate and regular rhythm.      Pulses: Normal pulses.      Heart sounds: Normal heart sounds.   Pulmonary:      Effort: Pulmonary effort is normal.      Breath sounds: Normal breath sounds.   Musculoskeletal:      Cervical back: Normal range of motion " and neck supple.   Feet:      Comments:      Neurological:      Mental Status: She is alert.         Procedures      Assessment/Plan:   Diagnoses and all orders for this visit:    1. Low back pain without sciatica, unspecified back pain laterality, unspecified chronicity (Primary)  Assessment & Plan:  Patient has pain in her neck, thoracic spine, and low back pain.  She has had this for years.  I saw an x-ray from 3 years ago that showed some minor degenerative changes in her thoracic spine.  I am going to give her some meloxicam, Flexeril, and send her to physical therapy for back strengthening exercises.  I am also going to get a C-spine, T-spine, and L-spine.  Send for physical therapy.    Orders:  -     POCT urinalysis dipstick, automated  -     Urine Culture - Urine, Urine, Clean Catch; Future  -     XR Spine Cervical 2 or 3 View; Future  -     XR Spine Thoracic 1 View (In Office)  -     XR Spine Lumbar 2 or 3 View (In Office)  -     Ambulatory Referral to Physical Therapy for Evaluation & Treatment    2. Chronic midline low back pain without sciatica  Assessment & Plan:  Patient has pain in her neck, thoracic spine, and low back pain.  She has had this for years.  I saw an x-ray from 3 years ago that showed some minor degenerative changes in her thoracic spine.  I am going to give her some meloxicam, Flexeril, and send her to physical therapy for back strengthening exercises.  I am also going to get a C-spine, T-spine, and L-spine.  Send for physical therapy.    Orders:  -     XR Spine Cervical 2 or 3 View; Future  -     XR Spine Thoracic 1 View (In Office)  -     XR Spine Lumbar 2 or 3 View (In Office)  -     Ambulatory Referral to Physical Therapy for Evaluation & Treatment    3. Cervicalgia    4. Cystitis  Assessment & Plan:  Infected.  We will culture urine and start Macrobid.      Other orders  -     nitrofurantoin, macrocrystal-monohydrate, (Macrobid) 100 MG capsule; Take 1 capsule by mouth 2 (Two) Times  a Day.  Dispense: 20 capsule; Refill: 0  -     meloxicam (MOBIC) 15 MG tablet; Take 1 tablet by mouth Daily.  Dispense: 30 tablet; Refill: 1  -     cyclobenzaprine (FLEXERIL) 10 MG tablet; Take 1 tablet by mouth 3 (Three) Times a Day As Needed for Muscle Spasms.  Dispense: 30 tablet; Refill: 1             Follow Up:   No follow-ups on file.      Hipolito Orr MD  [facility]

## 2024-05-22 NOTE — ASSESSMENT & PLAN NOTE
Patient has pain in her neck, thoracic spine, and low back pain.  She has had this for years.  I saw an x-ray from 3 years ago that showed some minor degenerative changes in her thoracic spine.  I am going to give her some meloxicam, Flexeril, and send her to physical therapy for back strengthening exercises.  I am also going to get a C-spine, T-spine, and L-spine.  Send for physical therapy.

## 2024-05-27 LAB
BACTERIA UR CULT: ABNORMAL
BACTERIA UR CULT: ABNORMAL
OTHER ANTIBIOTIC SUSC ISLT: ABNORMAL

## 2024-05-28 ENCOUNTER — TELEPHONE (OUTPATIENT)
Dept: FAMILY MEDICINE CLINIC | Facility: CLINIC | Age: 34
End: 2024-05-28
Payer: COMMERCIAL

## 2024-05-28 RX ORDER — SULFAMETHOXAZOLE AND TRIMETHOPRIM 800; 160 MG/1; MG/1
1 TABLET ORAL 2 TIMES DAILY
Qty: 14 TABLET | Refills: 0 | Status: SHIPPED | OUTPATIENT
Start: 2024-05-28

## 2024-05-28 RX ORDER — MELOXICAM 15 MG/1
15 TABLET ORAL DAILY
Qty: 30 TABLET | Refills: 1 | Status: SHIPPED | OUTPATIENT
Start: 2024-05-28

## 2024-05-28 NOTE — TELEPHONE ENCOUNTER
Pt needs to be on Bactrim, her Urine cx grew someithing resistent to Macrobid. I have called it in. Thanks. AGB

## 2024-06-11 DIAGNOSIS — M54.2 CERVICALGIA: Primary | ICD-10-CM

## 2024-06-11 RX ORDER — CYCLOBENZAPRINE HCL 10 MG
10 TABLET ORAL 3 TIMES DAILY PRN
Qty: 90 TABLET | Refills: 1 | Status: SHIPPED | OUTPATIENT
Start: 2024-06-11

## 2024-06-17 ENCOUNTER — TELEPHONE (OUTPATIENT)
Dept: FAMILY MEDICINE CLINIC | Facility: CLINIC | Age: 34
End: 2024-06-17
Payer: COMMERCIAL

## 2024-06-17 DIAGNOSIS — F43.29 STRESS AND ADJUSTMENT REACTION: ICD-10-CM

## 2024-06-17 RX ORDER — FLUOXETINE HYDROCHLORIDE 20 MG/1
20 CAPSULE ORAL DAILY
Qty: 90 CAPSULE | Refills: 1 | Status: SHIPPED | OUTPATIENT
Start: 2024-06-17

## 2024-06-17 RX ORDER — BUSPIRONE HYDROCHLORIDE 5 MG/1
15 TABLET ORAL 2 TIMES DAILY
Qty: 180 TABLET | Refills: 5 | Status: SHIPPED | OUTPATIENT
Start: 2024-06-17

## 2024-10-09 RX ORDER — MELOXICAM 15 MG/1
15 TABLET ORAL DAILY
Qty: 30 TABLET | Refills: 1 | Status: SHIPPED | OUTPATIENT
Start: 2024-10-09

## 2024-10-24 ENCOUNTER — TELEPHONE (OUTPATIENT)
Dept: FAMILY MEDICINE CLINIC | Facility: CLINIC | Age: 34
End: 2024-10-24

## 2024-10-24 NOTE — TELEPHONE ENCOUNTER
Caller: Svetlana Rizo    Relationship to patient: Self    Best call back number: [phone]     Chief complaint: NUMBNESS AND TINGLING IN HAND FOR SEVERAL WEEKS    Patient directed to call 911 or go to their nearest emergency room.     Patient verbalized understanding: [x] Yes  [] No  If no, why?        1.84

## 2024-11-21 ENCOUNTER — OFFICE VISIT (OUTPATIENT)
Dept: FAMILY MEDICINE CLINIC | Facility: CLINIC | Age: 34
End: 2024-11-21
Payer: COMMERCIAL

## 2024-11-21 VITALS
HEART RATE: 76 BPM | BODY MASS INDEX: 28.35 KG/M2 | OXYGEN SATURATION: 97 % | SYSTOLIC BLOOD PRESSURE: 120 MMHG | RESPIRATION RATE: 18 BRPM | HEIGHT: 63 IN | WEIGHT: 160 LBS | DIASTOLIC BLOOD PRESSURE: 80 MMHG

## 2024-11-21 DIAGNOSIS — Z00.00 PHYSICAL EXAM: Primary | ICD-10-CM

## 2024-11-21 DIAGNOSIS — J06.9 UPPER RESPIRATORY TRACT INFECTION, UNSPECIFIED TYPE: ICD-10-CM

## 2024-11-21 DIAGNOSIS — M25.531 WRIST PAIN, ACUTE, RIGHT: ICD-10-CM

## 2024-11-21 LAB
EXPIRATION DATE: NORMAL
FLUAV AG UPPER RESP QL IA.RAPID: NOT DETECTED
FLUBV AG UPPER RESP QL IA.RAPID: NOT DETECTED
INTERNAL CONTROL: NORMAL
Lab: NORMAL
SARS-COV-2 AG UPPER RESP QL IA.RAPID: NOT DETECTED

## 2024-11-21 NOTE — PROGRESS NOTES
Patient Name: Svetlana Rizo  : 1990   MRN: [id number]     Chief Complaint:    Chief Complaint   Patient presents with    Follow-up    Numbness     Hand numbness.        History of Present Illness: Svetlana Rizo is a 34 y.o. female who is here today for their annual health maintenance and physical.          Review of Systems:   Review of Systems   Constitutional: Negative.    HENT: Negative.     Eyes: Negative.    Respiratory: Negative.     Cardiovascular: Negative.    Gastrointestinal: Negative.    Neurological: Negative.        Past Medical History, Social History, Family History and Care Team were all reviewed with patient and updated as appropriate.     Medications:     Current Outpatient Medications:     busPIRone (BUSPAR) 5 MG tablet, Take 3 tablets by mouth 2 (Two) Times a Day., Disp: 180 tablet, Rfl: 5    cyclobenzaprine (FLEXERIL) 10 MG tablet, Take 1 tablet by mouth 3 (Three) Times a Day As Needed for Muscle Spasms., Disp: 90 tablet, Rfl: 1    FLUoxetine (PROzac) 20 MG capsule, Take 1 capsule by mouth Daily., Disp: 90 capsule, Rfl: 1    fluticasone (FLONASE) 50 MCG/ACT nasal spray, 2 sprays into the nostril(s) as directed by provider Daily., Disp: 9.9 mL, Rfl: 11    meloxicam (MOBIC) 15 MG tablet, TAKE 1 TABLET BY MOUTH DAILY, Disp: 30 tablet, Rfl: 1    pantoprazole (Protonix) 40 MG EC tablet, Take 1 tablet by mouth Daily Before Supper. Take one tablet 30 minutes prior to eating., Disp: 90 tablet, Rfl: 3    Allergies:   No Known Allergies    Health Maintenance   Topic Date Due    PAP SMEAR  Never done    ANNUAL PHYSICAL  2024    COVID-19 Vaccine ( season) 2024    BMI FOLLOWUP  2025    TDAP/TD VACCINES (3 - Td or Tdap) 2033    HEPATITIS C SCREENING  Completed    Hepatitis B  Completed    INFLUENZA VACCINE  Completed    Pneumococcal Vaccine 0-64  Aged Out        PHQ-2 Total Score:          Intimate partner violence: (Screen on initial visit, pregnant women, women  "with injuries, older adult with injury or evidence of neglect):  Violence can be a problem in many people's lives, so I now ask every patient about trauma or abuse they may have experienced in a relationship.  Stress/Safety - Do you feel safe in your relationship?  Afraid/Abused - Have you ever been in a relationship where you were threatened, hurt, or afraid?  Friend/Family - Are your friends aware you have been hurt?  Emergency Plan - Do you have a safe place to go and the resources you need in an emergency?    Osteoporosis:   Ost menopausal women < 65 with RF (advancing age, previous fracture, glucocorticoid therapy, parental hip fracture, low body weight, current cigarette smoking, excessive alcohol consumption, rheumatoid arthritis, secondary osteoporosis [hypogonadism/premature menopause, malabsorption, chronic liver disease, IBD]).  All women 65 or older      Physical Exam:  Vital Signs:   Vitals:    11/21/24 1426   BP: 120/80   BP Location: Left arm   Patient Position: Sitting   Cuff Size: Large Adult   Pulse: 76   Resp: 18   SpO2: 97%   Weight: 72.6 kg (160 lb)   Height: 160 cm (62.99\")     Body mass index is 28.35 kg/m².     Physical Exam  Vitals and nursing note reviewed.   Constitutional:       Appearance: Normal appearance. She is normal weight.   HENT:      Head: Normocephalic and atraumatic.      Right Ear: Tympanic membrane, ear canal and external ear normal.      Left Ear: Tympanic membrane, ear canal and external ear normal.      Nose: Nose normal.      Mouth/Throat:      Mouth: Mucous membranes are moist.      Pharynx: Oropharynx is clear.   Eyes:      Extraocular Movements: Extraocular movements intact.      Conjunctiva/sclera: Conjunctivae normal.      Pupils: Pupils are equal, round, and reactive to light.   Cardiovascular:      Rate and Rhythm: Normal rate and regular rhythm.      Pulses: Normal pulses.      Heart sounds: Normal heart sounds.   Pulmonary:      Effort: Pulmonary effort is " normal.      Breath sounds: Normal breath sounds.   Abdominal:      General: Abdomen is flat. Bowel sounds are normal.      Palpations: Abdomen is soft.   Musculoskeletal:         General: Normal range of motion.      Cervical back: Normal range of motion and neck supple.   Feet:      Comments:      Skin:     General: Skin is warm and dry.   Neurological:      General: No focal deficit present.      Mental Status: She is alert and oriented to person, place, and time.   Psychiatric:         Mood and Affect: Mood normal.         Behavior: Behavior normal.         Thought Content: Thought content normal.         Judgment: Judgment normal.         Procedures      Assessment/Plan:   Diagnoses and all orders for this visit:    1. Physical exam (Primary)  Assessment & Plan:  Discussed health maintenance, diet, exercise, and immunizations.    Orders:  -     CBC & Differential; Future  -     Lipid Panel; Future  -     Comprehensive Metabolic Panel; Future  -     TSH Rfx On Abnormal To Free T4; Future    2. Wrist pain, acute, right  Assessment & Plan:  Patient had a positive Phalen's test on right.  I am going to send her to Dr. Lima.    Orders:  -     Ambulatory Referral to Orthopedic Surgery  -     CBC & Differential; Future  -     Lipid Panel; Future  -     Comprehensive Metabolic Panel; Future  -     TSH Rfx On Abnormal To Free T4; Future    3. Upper respiratory tract infection, unspecified type  -     POCT SARS-CoV-2 + Flu Antigen KAREN  -     CBC & Differential; Future  -     Lipid Panel; Future  -     Comprehensive Metabolic Panel; Future  -     TSH Rfx On Abnormal To Free T4; Future    Other orders  -     Fluzone >6mos             Follow Up:   No follow-ups on file.    Healthcare Maintenance:   Counseling provided on health maintenance and immunizations.  Svetlana Rizo voices understanding and acceptance of this advice and will call back with any further questions or concerns. AVS with preventive healthcare tips printed  for patient.     Hipolito Orr MD  [facility] [place] [facility]

## 2024-12-05 DIAGNOSIS — M54.2 CERVICALGIA: ICD-10-CM

## 2024-12-05 RX ORDER — CYCLOBENZAPRINE HCL 10 MG
10 TABLET ORAL 3 TIMES DAILY PRN
Qty: 90 TABLET | Refills: 1 | Status: SHIPPED | OUTPATIENT
Start: 2024-12-05

## 2024-12-13 ENCOUNTER — OFFICE VISIT (OUTPATIENT)
Dept: ORTHOPEDIC SURGERY | Facility: CLINIC | Age: 34
End: 2024-12-13
Payer: COMMERCIAL

## 2024-12-13 VITALS — BODY MASS INDEX: 28.35 KG/M2 | HEIGHT: 63 IN | WEIGHT: 160 LBS

## 2024-12-13 DIAGNOSIS — M25.532 BILATERAL WRIST PAIN: ICD-10-CM

## 2024-12-13 DIAGNOSIS — M25.531 BILATERAL WRIST PAIN: ICD-10-CM

## 2024-12-13 DIAGNOSIS — G56.03 CARPAL TUNNEL SYNDROME, BILATERAL: Primary | ICD-10-CM

## 2024-12-13 NOTE — PROGRESS NOTES
[facility] Orthopedic     Office Visit       Date: 12/13/2024   Patient Name: Svetlana Rizo  MRN: [id number]  YOB: 1990    Referring Physician: Hipolito Orr MD     Chief Complaint:   Chief Complaint   Patient presents with    Left Wrist - Pain    Right Wrist - Pain       History of Present Illness:   Svetlana Rizo is a 34 y.o. female dominant presents with right greater than left bilateral hand pain numbness and tingling of 1 month duration.  She reports numbness and tingling in her thumb index middle finger that is worse and wakes her from sleep.  Reports it has been progressive over the last month.  Has tried bracing and anti-inflammatories with minimal improvement.  She works as a manager.  She denies weakness.  She denies smoking.  Otherwise healthy.      Subjective   Review of Systems:   Review of Systems   Constitutional:  Negative for chills, fever, unexpected weight gain and unexpected weight loss.   HENT:  Negative for congestion, postnasal drip and rhinorrhea.    Eyes:  Negative for blurred vision.   Respiratory:  Negative for shortness of breath.    Cardiovascular:  Negative for leg swelling.   Gastrointestinal:  Negative for abdominal pain, nausea and vomiting.   Genitourinary:  Negative for difficulty urinating.   Musculoskeletal:  Positive for arthralgias. Negative for gait problem, joint swelling and myalgias.   Skin:  Negative for skin lesions and wound.   Neurological:  Negative for dizziness, weakness, light-headedness and numbness.   Hematological:  Does not bruise/bleed easily.   Psychiatric/Behavioral:  Negative for depressed mood.         Pertinent review of systems per HPI.     I reviewed the patient's chief complaint, history of present illness, review of systems, past medical history, surgical history, family history, social history, medications and allergy list in the EMR on 12/13/2024 and agree with the  "findings above.    Objective    Vital Signs:   Vitals:    12/13/24 1145   Weight: 72.6 kg (160 lb)   Height: 160 cm (62.99\")     BMI: Body mass index is 28.35 kg/m².    General Appearance: No acute distress. Alert and oriented.     Chest:  Non-labored breathing on room air. Regular rate and rhythm.    Upper Extremity Exam:    Tinel at the bilateral carpal tunnels.  Positive right bilateral carpal compression test.  Negative left carpal compression test.  No thenar wasting.  Negative Tinel at the bilateral cubital tunnels.  Negative elbow flexion compression test.    Fingers are warm, well-perfused with appropriate capillary refill.  Palpable radial pulse.    Sensation intact to light touch in median, radial and ulnar nerve distributions.    Motor- Fires FPL, ulnar intrinsics, EPL/EDC w/ full active and passive range of motion. Strength intact.    Non-tender except for in the areas highlighted    Imaging/Studies:   Imaging Results (Last 24 Hours)       Procedure Component Value Units Date/Time    XR Wrist 3+ View Bilateral [[id number]] Resulted: 12/13/24 1134     Updated: 12/13/24 1139            X-ray of the bilateral wrist from 12/13/2024 were independently reviewed and interpreted myself demonstrate no bony abnormality    Procedures:  Procedures    Quality Measures:   ACP:   ACP discussion was deferred.    Tobacco:   Svetlana Rizo  reports that she quit smoking about 12 months ago. Her smoking use included cigarettes. She started smoking about 18 years ago. She has a 8.9 pack-year smoking history. She has been exposed to tobacco smoke. She has never used smokeless tobacco.      Assessment / Plan    Assessment/Plan:     Diagnoses and all orders for this visit:    Bilateral wrist pain  -     XR Wrist 3+ View Bilateral         Svetlana Rizois a 34 y.o. female who presents with:      ICD-10-CM ICD-9-CM   1. Carpal tunnel syndrome, bilateral  G56.03 354.0   2. Bilateral wrist pain  M25.531 719.43    M25.532  "         Presents with bilateral carpal tunnel syndrome.  We discussed the diagnosis of carpal tunnel syndrome as well as the options for treatment.  She has failed bracing and anti-inflammatories.  Recommend EMG nerve conduction studies to evaluate the severity.  Recommend follow-up after nerve studies to discuss the results.    Follow Up:   Return for Follow-up after EMG/NCS.        Mariano Lima MD  [facility] [place] [facility] Upper Ext[facility] Surgeon

## 2024-12-24 DIAGNOSIS — F43.29 STRESS AND ADJUSTMENT REACTION: ICD-10-CM

## 2024-12-27 DIAGNOSIS — F43.29 STRESS AND ADJUSTMENT REACTION: ICD-10-CM

## 2024-12-31 ENCOUNTER — TELEPHONE (OUTPATIENT)
Dept: FAMILY MEDICINE CLINIC | Facility: CLINIC | Age: 34
End: 2024-12-31
Payer: COMMERCIAL

## 2025-02-04 ENCOUNTER — OFFICE VISIT (OUTPATIENT)
Dept: FAMILY MEDICINE CLINIC | Facility: CLINIC | Age: 35
End: 2025-02-04
Payer: COMMERCIAL

## 2025-02-04 VITALS
SYSTOLIC BLOOD PRESSURE: 122 MMHG | WEIGHT: 155 LBS | BODY MASS INDEX: 27.46 KG/M2 | DIASTOLIC BLOOD PRESSURE: 82 MMHG | OXYGEN SATURATION: 98 % | HEART RATE: 68 BPM | HEIGHT: 63 IN | RESPIRATION RATE: 16 BRPM

## 2025-02-04 DIAGNOSIS — N30.90 CYSTITIS: ICD-10-CM

## 2025-02-04 DIAGNOSIS — R30.0 DYSURIA: Primary | ICD-10-CM

## 2025-02-04 DIAGNOSIS — M54.2 CERVICALGIA: ICD-10-CM

## 2025-02-04 LAB
BILIRUB BLD-MCNC: NEGATIVE MG/DL
CLARITY, POC: CLEAR
COLOR UR: YELLOW
EXPIRATION DATE: ABNORMAL
GLUCOSE UR STRIP-MCNC: NEGATIVE MG/DL
KETONES UR QL: NEGATIVE
LEUKOCYTE EST, POC: NEGATIVE
Lab: ABNORMAL
NITRITE UR-MCNC: NEGATIVE MG/ML
PH UR: 6 [PH] (ref 5–8)
PROT UR STRIP-MCNC: NEGATIVE MG/DL
RBC # UR STRIP: ABNORMAL /UL
SP GR UR: 1.02 (ref 1–1.03)
UROBILINOGEN UR QL: NORMAL

## 2025-02-04 PROCEDURE — 99214 OFFICE O/P EST MOD 30 MIN: CPT | Performed by: FAMILY MEDICINE

## 2025-02-04 PROCEDURE — 1126F AMNT PAIN NOTED NONE PRSNT: CPT | Performed by: FAMILY MEDICINE

## 2025-02-04 RX ORDER — NITROFURANTOIN 25; 75 MG/1; MG/1
100 CAPSULE ORAL 2 TIMES DAILY
Qty: 20 CAPSULE | Refills: 0 | Status: SHIPPED | OUTPATIENT
Start: 2025-02-04

## 2025-02-04 RX ORDER — CYCLOBENZAPRINE HCL 10 MG
10 TABLET ORAL 3 TIMES DAILY PRN
Qty: 90 TABLET | Refills: 1 | Status: SHIPPED | OUTPATIENT
Start: 2025-02-04

## 2025-02-04 RX ORDER — MELOXICAM 15 MG/1
15 TABLET ORAL DAILY
Qty: 30 TABLET | Refills: 1 | Status: SHIPPED | OUTPATIENT
Start: 2025-02-04

## 2025-02-04 NOTE — PROGRESS NOTES
Patient Name: Svetlana Rizo  : 1990   MRN: [id number]     Chief Complaint:    Chief Complaint   Patient presents with    Urinary Tract Infection       History of Present Illness: Svetlana Rizo is a 35 y.o. female who is here today for follow up on dysuria and neck pain.  Urinary Tract Infection             Review of Systems:   Review of Systems   Constitutional: Negative.    HENT: Negative.     Eyes: Negative.    Respiratory: Negative.     Cardiovascular: Negative.    Gastrointestinal: Negative.    Genitourinary: Negative.         Pelvic pressure   Neurological: Negative.         Past Medical History:   Past Medical History:   Diagnosis Date    Anxiety     Hepatitis     Neurotic depression        Past Surgical History: No past surgical history on file.    Family History:   Family History   Problem Relation Age of Onset    Asthma Mother     Migraines Mother     Depression Sister     Diabetes Sister     Migraines Sister     Asthma Sister     Mental illness Sister     Seizures Sister     Cancer Other     Alcohol abuse Other     Mental illness Other     Depression Other     Diabetes Other     Asthma Other     Osteoporosis Other     Migraines Other     Seizures Other     Eczema Other        Social History:   Social History     Socioeconomic History    Marital status: Single   Tobacco Use    Smoking status: Former     Current packs/day: 0.00     Average packs/day: 0.5 packs/day for 17.9 years (8.9 ttl pk-yrs)     Types: Cigarettes     Start date:      Quit date: 2023     Years since quittin.2     Passive exposure: Past    Smokeless tobacco: Never   Vaping Use    Vaping status: Some Days   Substance and Sexual Activity    Alcohol use: Not Currently    Drug use: Not Currently    Sexual activity: Defer       Medications:     Current Outpatient Medications:     busPIRone (BUSPAR) 5 MG tablet, Take 3 tablets by mouth 2 (Two) Times a Day., Disp: 180 tablet, Rfl: 5    cyclobenzaprine (FLEXERIL) 10 MG  "tablet, Take 1 tablet by mouth 3 (Three) Times a Day As Needed for Muscle Spasms., Disp: 90 tablet, Rfl: 1    FLUoxetine (PROzac) 20 MG capsule, TAKE 1 CAPSULE BY MOUTH DAILY, Disp: 90 capsule, Rfl: 0    fluticasone (FLONASE) 50 MCG/ACT nasal spray, 2 sprays into the nostril(s) as directed by provider Daily., Disp: 9.9 mL, Rfl: 11    meloxicam (MOBIC) 15 MG tablet, Take 1 tablet by mouth Daily., Disp: 30 tablet, Rfl: 1    pantoprazole (Protonix) 40 MG EC tablet, Take 1 tablet by mouth Daily Before Supper. Take one tablet 30 minutes prior to eating., Disp: 90 tablet, Rfl: 3    nitrofurantoin, macrocrystal-monohydrate, (Macrobid) 100 MG capsule, Take 1 capsule by mouth 2 (Two) Times a Day., Disp: 20 capsule, Rfl: 0    Allergies:   No Known Allergies      Physical Exam:  Vital Signs:   Vitals:    02/04/25 1453   BP: 122/82   BP Location: Left arm   Patient Position: Sitting   Cuff Size: Adult   Pulse: 68   Resp: 16   SpO2: 98%   Weight: 70.3 kg (155 lb)   Height: 160 cm (62.99\")     Body mass index is 27.46 kg/m².     Physical Exam  Vitals and nursing note reviewed.   Constitutional:       Appearance: Normal appearance. She is normal weight.   HENT:      Head: Normocephalic and atraumatic.      Right Ear: Tympanic membrane, ear canal and external ear normal.      Left Ear: Tympanic membrane, ear canal and external ear normal.      Nose: Nose normal.      Mouth/Throat:      Mouth: Mucous membranes are dry.      Pharynx: Oropharynx is clear.   Eyes:      Extraocular Movements: Extraocular movements intact.      Conjunctiva/sclera: Conjunctivae normal.      Pupils: Pupils are equal, round, and reactive to light.   Cardiovascular:      Rate and Rhythm: Normal rate and regular rhythm.      Pulses: Normal pulses.      Heart sounds: Normal heart sounds.   Pulmonary:      Effort: Pulmonary effort is normal.      Breath sounds: Normal breath sounds.   Musculoskeletal:      Cervical back: Normal range of motion and neck supple. "   Feet:      Comments:      Neurological:      Mental Status: She is alert.         Procedures      Assessment/Plan:   Diagnoses and all orders for this visit:    1. Dysuria (Primary)  Assessment & Plan:  Culture urine    Orders:  -     POCT urinalysis dipstick, automated  -     Urine Culture - Urine, Urine, Clean Catch; Future  -     Urine Culture - Urine, Urine, Clean Catch    2. Cystitis  Assessment & Plan:  We will culture urine and start Macrobid.      3. Cervicalgia  Assessment & Plan:  She will take meloxicam every other day.  Will call in Flexeril.    Orders:  -     cyclobenzaprine (FLEXERIL) 10 MG tablet; Take 1 tablet by mouth 3 (Three) Times a Day As Needed for Muscle Spasms.  Dispense: 90 tablet; Refill: 1    Other orders  -     meloxicam (MOBIC) 15 MG tablet; Take 1 tablet by mouth Daily.  Dispense: 30 tablet; Refill: 1  -     nitrofurantoin, macrocrystal-monohydrate, (Macrobid) 100 MG capsule; Take 1 capsule by mouth 2 (Two) Times a Day.  Dispense: 20 capsule; Refill: 0             Follow Up:   No follow-ups on file.      Hipolito Orr MD  [facility] [place] [facility]

## 2025-02-04 NOTE — ASSESSMENT & PLAN NOTE
Patient called and would like a call back retarding medication prescription: Ritalin. Please advise patient.     Patient would like medication sent to [facility]    She will take meloxicam every other day.  Will call in Flexeril.

## 2025-02-11 LAB
BACTERIA UR CULT: ABNORMAL
BACTERIA UR CULT: ABNORMAL
OTHER ANTIBIOTIC SUSC ISLT: ABNORMAL

## 2025-03-18 ENCOUNTER — PATIENT MESSAGE (OUTPATIENT)
Dept: ORTHOPEDIC SURGERY | Facility: CLINIC | Age: 35
End: 2025-03-18
Payer: COMMERCIAL

## 2025-03-18 NOTE — TELEPHONE ENCOUNTER
I contacted the patient and left her a voicemail that included both orthopedic office locations ([place] [facility]) in [place].    Noelle Mcintyre MA

## 2025-03-19 ENCOUNTER — LAB (OUTPATIENT)
Dept: FAMILY MEDICINE CLINIC | Facility: CLINIC | Age: 35
End: 2025-03-19
Payer: COMMERCIAL

## 2025-03-19 ENCOUNTER — PROCEDURE VISIT (OUTPATIENT)
Dept: NEUROLOGY | Facility: CLINIC | Age: 35
End: 2025-03-19
Payer: COMMERCIAL

## 2025-03-19 DIAGNOSIS — J06.9 UPPER RESPIRATORY TRACT INFECTION, UNSPECIFIED TYPE: ICD-10-CM

## 2025-03-19 DIAGNOSIS — Z00.00 PHYSICAL EXAM: ICD-10-CM

## 2025-03-19 DIAGNOSIS — M25.531 WRIST PAIN, ACUTE, RIGHT: ICD-10-CM

## 2025-03-19 DIAGNOSIS — G56.03 CARPAL TUNNEL SYNDROME, BILATERAL: ICD-10-CM

## 2025-03-19 DIAGNOSIS — G56.01 CARPAL TUNNEL SYNDROME OF RIGHT WRIST: Primary | ICD-10-CM

## 2025-03-19 NOTE — PROGRESS NOTES
[facility]   Electrodiagnostic Laboratory    Nerve Conduction & EMG Report        Patient: Svetlana Rizo   Patient ID: [id number]   YOB: 1990  Sex: female      Exam Physician:  Efe Gutierrez MD  Refer Physician:  Mariano Lima MD    Electromyogram and Nerve Conduction Velocity Procedure Note    Hx: 35 y.o. right handed female with complaint of numbness involving the the upper extremities. Symptoms have been present for several months and were provoked by worse at night. Significant past medical history includes nothing suggestive of neuropathy.  Family history no family history of nerve or muscle disease.    Exam: Motor power is normal. There is no atrophy. There are no fasciculations. Deep tendon reflexes are present and symmetrical. Sensory exam is normal.      Edx studies of the B UE were performed to evaluate for [peripheral nerve entrapment.     NCS Examination   For sensory nerve conduction studies, the amplitude is measured peak-to-peak, the latency reported is the distal peak latency, and the conduction velocity, if measured, is determined from onset latencies and is over the forearm.   For motor nerve conduction studies, the amplitude is measured baseline-to-peak, the latency reported is the distal onset latency, the conduction velocity is calculated over the forearm, and the F wave latency is the minimum latency.   Unless otherwise noted, the hand temperature was monitored continuously and remained between 32°C and 36°C during the performance of the NCSs.          Nerve Conduction Studies  Anti Sensory Summary Table     Stim Site NR Norm Peak (ms) O-P Amp (µV) Norm O-P Amp Onset (ms) Site1 Site2 Delta-0 (ms) Dist (cm) Terrence (m/s) Norm Terrence (m/s)   Left Median Anti Sensory (2nd Digit)   Wrist    <3.6 29.1 >10 2.8 Wrist 2nd Digit 2.8 14.0 50    Right Median Anti Sensory (2nd Digit)   Wrist *NR <3.6  >10  Wrist 2nd Digit  14.0     Left Radial Anti Sensory (Base 1st  Digit)   Wrist    <3.1 25.7  1.1 Wrist Base 1st Digit 1.1 0.0     Right Radial Anti Sensory (Base 1st Digit)   Wrist    <3.1 20.1  1.4 Wrist Base 1st Digit 1.4 0.0     Left Ulnar Anti Sensory (5th Digit)   Wrist    <3.7 34.7 >15.0 1.9 Wrist 5th Digit 1.9 0.0     Right Ulnar Anti Sensory (5th Digit)   Wrist    <3.7 35.6 >15.0 2.0 Wrist 5th Digit 2.0 0.0       Motor Summary Table     Stim Site NR Onset (ms) Norm Onset (ms) O-P Amp (mV) Norm O-P Amp Site1 Site2 Delta-0 (ms) Dist (cm) Terrence (m/s) Norm Terrence (m/s)   Left Median Motor (Abd Poll Brev)   Wrist    3.9 <4.2 5.1 >5 Elbow Wrist 4.0 22.0 55 >50   Elbow    7.9  4.0          Right Median Motor (Abd Poll Brev)   Wrist    *5.3 <4.2 5.3 >5 Elbow Wrist 3.5 23.0 66 >50   Elbow    8.8  5.5          Left Ulnar Motor (Abd Dig Minimi)   Wrist    3.0 <4.2 6.1 >3 B Elbow Wrist 3.8 23.0 61 >53   B Elbow    6.8  5.8  A Elbow B Elbow 1.2 9.0 75 >53   A Elbow    8.0  6.3          Right Ulnar Motor (Abd Dig Minimi)   Wrist    2.8 <4.2 4.5 >3 B Elbow Wrist 3.7 23.0 62 >53   B Elbow    6.5  4.7  A Elbow B Elbow 1.6 9.0 56 >53   A Elbow    8.1  7.4            F Wave Studies     NR F-Lat (ms) Lat Norm (ms) L-R F-Lat (ms) L-R Lat Norm   Left Median (Mrkrs) (Abd Poll Brev)      30.16 <33 0.00 <2.2   Right Median (Mrkrs) (Abd Poll Brev)      30.16 <33 0.00 <2.2   Left Ulnar (Mrkrs) (Abd Dig Min)      32.65 <36 0.24 <2.5   Right Ulnar (Mrkrs) (Abd Dig Min)      32.89 <36 0.24 <2.5     EMG Examination   The study was performed with a concentric needle electrode. Fibrillation and fasciculation activity is graded from none (0) to continuous (4+). The configuration and recruitment pattern of motor unit action potentials under voluntary control, if not normal, are described below     Side Muscle Nerve Root Ins Act Fibs Psw Amp Dur Poly Recrt Int Pat Comment   Right Deltoid Axillary C5-6 Nml Nml Nml Nml Nml 0 Nml Nml    Right Triceps Radial C6-7-8 Nml Nml Nml Nml Nml 0 Nml Nml    Right Biceps  Musculocut C5-6 Nml Nml Nml Nml Nml 0 Nml Nml    Right PronatorTeres Median C6-7 Nml Nml Nml Nml Nml 0 Nml Nml    Right 1stDorInt Ulnar C8-T1 Nml Nml Nml Nml Nml 0 Nml Nml    Right Abd Poll Brev Median C8-T1 *Incr *1+ *1+ Nml Nml 0 Nml Nml    Left Deltoid Axillary C5-6 Nml Nml Nml Nml Nml 0 Nml Nml    Left Triceps Radial C6-7-8 Nml Nml Nml Nml Nml 0 Nml Nml    Left Biceps Musculocut C5-6 Nml Nml Nml Nml Nml 0 Nml Nml    Left PronatorTeres Median C6-7 Nml Nml Nml Nml Nml 0 Nml Nml    Left 1stDorInt Ulnar C8-T1 Nml Nml Nml Nml Nml 0 Nml Nml    Left Abd Poll Brev Median C8-T1 Nml Nml Nml Nml Nml 0 Nml Nml          NCV FINDINGS:  Evaluation of the right median motor nerve showed prolonged distal onset latency (5.3 ms).  The right median sensory nerve showed no response (Wrist).  All remaining nerves (as indicated in the following tables) were within normal limits.  All F Wave latencies were within normal limits.    EMG FINDINGS:  Needle evaluation of the right abductor pollicis brevis muscle showed increased insertional activity and slightly increased spontaneous activity.  All remaining muscles (as indicated in the following table) showed no evidence of electrical instability.    Conclusion: This study showed neurophysiologic evidence of a median mononeuropathy of the right wrists. This would be consistent with the clinical diagnosis of carpal tunnel syndrome, moderate.           Instrument used:  Teca Synergy        Performed by:          Efe Gutierrez MD

## 2025-03-20 ENCOUNTER — RESULTS FOLLOW-UP (OUTPATIENT)
Dept: FAMILY MEDICINE CLINIC | Facility: CLINIC | Age: 35
End: 2025-03-20
Payer: COMMERCIAL

## 2025-03-20 LAB
ALBUMIN SERPL-MCNC: 4.7 G/DL (ref 3.9–4.9)
ALP SERPL-CCNC: 52 IU/L (ref 44–121)
ALT SERPL-CCNC: 15 IU/L (ref 0–32)
AST SERPL-CCNC: 13 IU/L (ref 0–40)
BASOPHILS # BLD AUTO: 0 X10E3/UL (ref 0–0.2)
BASOPHILS NFR BLD AUTO: 1 %
BILIRUB SERPL-MCNC: 0.3 MG/DL (ref 0–1.2)
BUN SERPL-MCNC: 12 MG/DL (ref 6–20)
BUN/CREAT SERPL: 16 (ref 9–23)
CALCIUM SERPL-MCNC: 9.7 MG/DL (ref 8.7–10.2)
CHLORIDE SERPL-SCNC: 103 MMOL/L (ref 96–106)
CHOLEST SERPL-MCNC: 177 MG/DL (ref 100–199)
CO2 SERPL-SCNC: 23 MMOL/L (ref 20–29)
CREAT SERPL-MCNC: 0.75 MG/DL (ref 0.57–1)
EGFRCR SERPLBLD CKD-EPI 2021: 106 ML/MIN/1.73
EOSINOPHIL # BLD AUTO: 0 X10E3/UL (ref 0–0.4)
EOSINOPHIL NFR BLD AUTO: 1 %
ERYTHROCYTE [DISTWIDTH] IN BLOOD BY AUTOMATED COUNT: 11.6 % (ref 11.7–15.4)
GLOBULIN SER CALC-MCNC: 2.4 G/DL (ref 1.5–4.5)
GLUCOSE SERPL-MCNC: 104 MG/DL (ref 70–99)
HCT VFR BLD AUTO: 44.3 % (ref 34–46.6)
HDLC SERPL-MCNC: 50 MG/DL
HGB BLD-MCNC: 14.9 G/DL (ref 11.1–15.9)
IMM GRANULOCYTES # BLD AUTO: 0 X10E3/UL (ref 0–0.1)
IMM GRANULOCYTES NFR BLD AUTO: 0 %
LDLC SERPL CALC-MCNC: 118 MG/DL (ref 0–99)
LYMPHOCYTES # BLD AUTO: 1.9 X10E3/UL (ref 0.7–3.1)
LYMPHOCYTES NFR BLD AUTO: 41 %
MCH RBC QN AUTO: 30.3 PG (ref 26.6–33)
MCHC RBC AUTO-ENTMCNC: 33.6 G/DL (ref 31.5–35.7)
MCV RBC AUTO: 90 FL (ref 79–97)
MONOCYTES # BLD AUTO: 0.5 X10E3/UL (ref 0.1–0.9)
MONOCYTES NFR BLD AUTO: 11 %
NEUTROPHILS # BLD AUTO: 2.1 X10E3/UL (ref 1.4–7)
NEUTROPHILS NFR BLD AUTO: 46 %
PLATELET # BLD AUTO: 315 X10E3/UL (ref 150–450)
POTASSIUM SERPL-SCNC: 4.2 MMOL/L (ref 3.5–5.2)
PROT SERPL-MCNC: 7.1 G/DL (ref 6–8.5)
RBC # BLD AUTO: 4.91 X10E6/UL (ref 3.77–5.28)
SODIUM SERPL-SCNC: 139 MMOL/L (ref 134–144)
T4 FREE SERPL-MCNC: 1.18 NG/DL (ref 0.82–1.77)
TRIGL SERPL-MCNC: 47 MG/DL (ref 0–149)
TSH SERPL DL<=0.005 MIU/L-ACNC: 7.86 UIU/ML (ref 0.45–4.5)
VLDLC SERPL CALC-MCNC: 9 MG/DL (ref 5–40)
WBC # BLD AUTO: 4.6 X10E3/UL (ref 3.4–10.8)

## 2025-03-24 ENCOUNTER — TELEPHONE (OUTPATIENT)
Dept: FAMILY MEDICINE CLINIC | Facility: CLINIC | Age: 35
End: 2025-03-24
Payer: COMMERCIAL

## 2025-03-28 PROBLEM — E78.2 HYPERLIPIDEMIA, MIXED: Status: ACTIVE | Noted: 2025-03-28

## 2025-03-28 PROBLEM — R73.9 HYPERGLYCEMIA: Status: ACTIVE | Noted: 2025-03-28

## 2025-03-28 PROBLEM — E03.9 ACQUIRED HYPOTHYROIDISM: Status: ACTIVE | Noted: 2025-03-28

## 2025-03-28 NOTE — PROGRESS NOTES
Patient Name: Svetlana Rizo  : 1990   MRN: [id number]     Chief Complaint:    Chief Complaint   Patient presents with    Primary Care Follow-Up       History of Present Illness: Svetlana Rizo is a 35 y.o. female who is here today for follow up on blood sugar, thyroid, cholesterol.  HPI        Review of Systems:   Review of Systems   Constitutional: Negative.    HENT: Negative.     Eyes: Negative.    Respiratory: Negative.     Cardiovascular: Negative.    Gastrointestinal: Negative.    Neurological: Negative.         Past Medical History:   Past Medical History:   Diagnosis Date    Anxiety     Hepatitis     Neurotic depression        Past Surgical History: No past surgical history on file.    Family History:   Family History   Problem Relation Age of Onset    Asthma Mother     Migraines Mother     Depression Sister     Diabetes Sister     Migraines Sister     Asthma Sister     Mental illness Sister     Seizures Sister     Cancer Other     Alcohol abuse Other     Mental illness Other     Depression Other     Diabetes Other     Asthma Other     Osteoporosis Other     Migraines Other     Seizures Other     Eczema Other        Social History:   Social History     Socioeconomic History    Marital status: Single   Tobacco Use    Smoking status: Former     Current packs/day: 0.00     Average packs/day: 0.5 packs/day for 17.9 years (8.9 ttl pk-yrs)     Types: Cigarettes     Start date:      Quit date: 2023     Years since quittin.3     Passive exposure: Past    Smokeless tobacco: Never   Vaping Use    Vaping status: Some Days   Substance and Sexual Activity    Alcohol use: Not Currently    Drug use: Not Currently    Sexual activity: Defer       Medications:     Current Outpatient Medications:     busPIRone (BUSPAR) 5 MG tablet, Take 3 tablets by mouth 2 (Two) Times a Day., Disp: 180 tablet, Rfl: 5    cyclobenzaprine (FLEXERIL) 10 MG tablet, Take 1 tablet by mouth 3 (Three) Times a Day As Needed for  "Muscle Spasms., Disp: 90 tablet, Rfl: 1    FLUoxetine (PROzac) 20 MG capsule, TAKE 1 CAPSULE BY MOUTH DAILY, Disp: 90 capsule, Rfl: 0    fluticasone (FLONASE) 50 MCG/ACT nasal spray, 2 sprays into the nostril(s) as directed by provider Daily., Disp: 9.9 mL, Rfl: 11    meloxicam (MOBIC) 15 MG tablet, Take 1 tablet by mouth Daily., Disp: 30 tablet, Rfl: 1    nitrofurantoin, macrocrystal-monohydrate, (Macrobid) 100 MG capsule, Take 1 capsule by mouth 2 (Two) Times a Day., Disp: 20 capsule, Rfl: 0    pantoprazole (Protonix) 40 MG EC tablet, Take 1 tablet by mouth Daily Before Supper. Take one tablet 30 minutes prior to eating., Disp: 90 tablet, Rfl: 3    traZODone (DESYREL) 50 MG tablet, Take 1 tablet by mouth Every Night., Disp: 90 tablet, Rfl: 1    Allergies:   No Known Allergies      Physical Exam:  Vital Signs:   Vitals:    03/31/25 0856   BP: 148/82   BP Location: Left arm   Patient Position: Sitting   Cuff Size: Adult   Pulse: 96   Resp: 16   SpO2: 100%   Weight: 67.1 kg (147 lb 14.4 oz)   Height: 160 cm (62.99\")     Body mass index is 26.21 kg/m².     Physical Exam  Vitals and nursing note reviewed.   Constitutional:       Appearance: Normal appearance. She is normal weight.   HENT:      Head: Normocephalic and atraumatic.      Right Ear: Tympanic membrane, ear canal and external ear normal.      Left Ear: Tympanic membrane, ear canal and external ear normal.      Nose: Nose normal.      Mouth/Throat:      Mouth: Mucous membranes are dry.      Pharynx: Oropharynx is clear.   Eyes:      Extraocular Movements: Extraocular movements intact.      Conjunctiva/sclera: Conjunctivae normal.      Pupils: Pupils are equal, round, and reactive to light.   Cardiovascular:      Rate and Rhythm: Normal rate and regular rhythm.      Pulses: Normal pulses.      Heart sounds: Normal heart sounds.   Pulmonary:      Effort: Pulmonary effort is normal.      Breath sounds: Normal breath sounds.   Musculoskeletal:      Cervical back: " Normal range of motion and neck supple.   Feet:      Comments:      Neurological:      Mental Status: She is alert.         Procedures      Assessment/Plan:   Diagnoses and all orders for this visit:    1. Hyperglycemia (Primary)  Assessment & Plan:  Blood glucose was 104.  Check in A1 see today    Orders:  -     Measles / Mumps / Rubella Immunity; Future  -     Hemoglobin A1c; Future  -     Hemoglobin A1c; Future  -     Vitamin B12; Future  -     Vitamin D,25-Hydroxy; Future  -     Lipid Panel; Future  -     Comprehensive Metabolic Panel; Future  -     TSH Rfx On Abnormal To Free T4; Future  -     CBC & Differential; Future    2. Acquired hypothyroidism  Assessment & Plan:  TSH is 7.860.  Free T4 still normal at 1.18.  I am going to recheck this today.    Orders:  -     TSH Rfx On Abnormal To Free T4; Future  -     Hemoglobin A1c; Future  -     Vitamin B12; Future  -     Vitamin D,25-Hydroxy; Future  -     Lipid Panel; Future  -     Comprehensive Metabolic Panel; Future  -     TSH Rfx On Abnormal To Free T4; Future  -     CBC & Differential; Future    3. Hyperlipidemia, mixed  Assessment & Plan:  HDL 50.  .  Recheck in 6 months.    Orders:  -     Hemoglobin A1c; Future  -     Vitamin B12; Future  -     Vitamin D,25-Hydroxy; Future  -     Lipid Panel; Future  -     Comprehensive Metabolic Panel; Future  -     TSH Rfx On Abnormal To Free T4; Future  -     CBC & Differential; Future    4. Night sweats  Assessment & Plan:  CBC was normal.  Will check an FSH and LH.    Orders:  -     FSH & LH; Future  -     Hemoglobin A1c; Future  -     Vitamin B12; Future  -     Vitamin D,25-Hydroxy; Future  -     Lipid Panel; Future  -     Comprehensive Metabolic Panel; Future  -     TSH Rfx On Abnormal To Free T4; Future  -     CBC & Differential; Future    5. Adjustment disorder, unspecified type  Assessment & Plan:  Patient has a mom with end-stage COPD.  This is causing her stress and she is having difficulty sleeping.  I  am going to start trazodone.    Orders:  -     Hemoglobin A1c; Future  -     Vitamin B12; Future  -     Vitamin D,25-Hydroxy; Future  -     Lipid Panel; Future  -     Comprehensive Metabolic Panel; Future  -     TSH Rfx On Abnormal To Free T4; Future  -     CBC & Differential; Future    6. Elevated blood pressure reading  Assessment & Plan:  Patient is upset today.  She will monitor her blood pressure outside office.    Orders:  -     Hemoglobin A1c; Future  -     Vitamin B12; Future  -     Vitamin D,25-Hydroxy; Future  -     Lipid Panel; Future  -     Comprehensive Metabolic Panel; Future  -     TSH Rfx On Abnormal To Free T4; Future  -     CBC & Differential; Future    Other orders  -     traZODone (DESYREL) 50 MG tablet; Take 1 tablet by mouth Every Night.  Dispense: 90 tablet; Refill: 1             Follow Up:   Return in about 6 months (around 9/30/2025) for Annual physical, Bloodwork 1 week prior to next appointment.      Hipolito Orr MD  [facility]

## 2025-03-31 ENCOUNTER — OFFICE VISIT (OUTPATIENT)
Dept: FAMILY MEDICINE CLINIC | Facility: CLINIC | Age: 35
End: 2025-03-31
Payer: COMMERCIAL

## 2025-03-31 VITALS
HEIGHT: 63 IN | BODY MASS INDEX: 26.21 KG/M2 | HEART RATE: 96 BPM | SYSTOLIC BLOOD PRESSURE: 148 MMHG | WEIGHT: 147.9 LBS | OXYGEN SATURATION: 100 % | DIASTOLIC BLOOD PRESSURE: 82 MMHG | RESPIRATION RATE: 16 BRPM

## 2025-03-31 DIAGNOSIS — R61 NIGHT SWEATS: ICD-10-CM

## 2025-03-31 DIAGNOSIS — R73.9 HYPERGLYCEMIA: Primary | ICD-10-CM

## 2025-03-31 DIAGNOSIS — F43.20 ADJUSTMENT DISORDER, UNSPECIFIED TYPE: ICD-10-CM

## 2025-03-31 DIAGNOSIS — E03.9 ACQUIRED HYPOTHYROIDISM: ICD-10-CM

## 2025-03-31 DIAGNOSIS — E78.2 HYPERLIPIDEMIA, MIXED: ICD-10-CM

## 2025-03-31 DIAGNOSIS — R03.0 ELEVATED BLOOD PRESSURE READING: ICD-10-CM

## 2025-03-31 PROCEDURE — 1126F AMNT PAIN NOTED NONE PRSNT: CPT | Performed by: FAMILY MEDICINE

## 2025-03-31 PROCEDURE — 99214 OFFICE O/P EST MOD 30 MIN: CPT | Performed by: FAMILY MEDICINE

## 2025-03-31 RX ORDER — TRAZODONE HYDROCHLORIDE 50 MG/1
50 TABLET ORAL NIGHTLY
Qty: 90 TABLET | Refills: 1 | Status: SHIPPED | OUTPATIENT
Start: 2025-03-31

## 2025-03-31 NOTE — ASSESSMENT & PLAN NOTE
Patient has a mom with end-stage COPD.  This is causing her stress and she is having difficulty sleeping.  I am going to start trazodone.

## 2025-04-01 ENCOUNTER — PATIENT MESSAGE (OUTPATIENT)
Dept: NEUROLOGY | Facility: CLINIC | Age: 35
End: 2025-04-01
Payer: COMMERCIAL

## 2025-04-01 ENCOUNTER — PATIENT MESSAGE (OUTPATIENT)
Dept: ORTHOPEDIC SURGERY | Facility: CLINIC | Age: 35
End: 2025-04-01
Payer: COMMERCIAL

## 2025-04-01 LAB
FSH SERPL-ACNC: 10.8 MIU/ML
HBA1C MFR BLD: 5.4 % (ref 4.8–5.6)
LH SERPL-ACNC: 7.5 MIU/ML
MEV IGG SER IA-ACNC: 54.3 AU/ML
MUV IGG SER IA-ACNC: 58.3 AU/ML
RUBV IGG SERPL IA-ACNC: 2.82 INDEX
T4 FREE SERPL-MCNC: 1.25 NG/DL (ref 0.82–1.77)
TSH SERPL DL<=0.005 MIU/L-ACNC: 5.94 UIU/ML (ref 0.45–4.5)

## 2025-04-10 DIAGNOSIS — F43.29 STRESS AND ADJUSTMENT REACTION: ICD-10-CM

## 2025-04-17 ENCOUNTER — OFFICE VISIT (OUTPATIENT)
Age: 35
End: 2025-04-17
Payer: COMMERCIAL

## 2025-04-17 VITALS
BODY MASS INDEX: 24.16 KG/M2 | SYSTOLIC BLOOD PRESSURE: 95 MMHG | HEIGHT: 64 IN | WEIGHT: 141.5 LBS | DIASTOLIC BLOOD PRESSURE: 70 MMHG

## 2025-04-17 DIAGNOSIS — G56.01 CARPAL TUNNEL SYNDROME OF RIGHT WRIST: Primary | ICD-10-CM

## 2025-04-17 RX ORDER — MULTIPLE VITAMINS W/ MINERALS TAB 9MG-400MCG
1 TAB ORAL DAILY
COMMUNITY

## 2025-04-17 RX ORDER — TRIAMCINOLONE ACETONIDE 40 MG/ML
20 INJECTION, SUSPENSION INTRA-ARTICULAR; INTRAMUSCULAR
Status: COMPLETED | OUTPATIENT
Start: 2025-04-17 | End: 2025-04-17

## 2025-04-17 RX ORDER — LIDOCAINE HYDROCHLORIDE 10 MG/ML
0.5 INJECTION, SOLUTION EPIDURAL; INFILTRATION; INTRACAUDAL; PERINEURAL
Status: COMPLETED | OUTPATIENT
Start: 2025-04-17 | End: 2025-04-17

## 2025-04-17 RX ADMIN — LIDOCAINE HYDROCHLORIDE 0.5 ML: 10 INJECTION, SOLUTION EPIDURAL; INFILTRATION; INTRACAUDAL; PERINEURAL at 11:47

## 2025-04-17 RX ADMIN — TRIAMCINOLONE ACETONIDE 20 MG: 40 INJECTION, SUSPENSION INTRA-ARTICULAR; INTRAMUSCULAR at 11:47

## 2025-04-17 NOTE — PROGRESS NOTES
"                                                                 [facility]     Follow-up Office Visit       Date: 04/17/2025   Patient Name: Svetlana Rizo  MRN: [id number]  YOB: 1990    Chief Complaint:   Chief Complaint   Patient presents with    Follow-up     4 month follow up-   Carpal tunnel syndrome, bilateral  (EMG 3/19/25)       History of Present Illness:   Svetlana Rizo is a 35 y.o. female presents for follow-up of bilateral carpal tunnel syndrome.  She had EMG nerve conduction study done since her last visit which demonstrated evidence of right carpal tunnel syndrome.  No evidence of left carpal tunnel syndrome.  She reports her symptoms are unchanged since her last visit.  Reports numbness and tingling in thumb index middle and ring finger is worse on the right.  Reports symptoms wake her up from sleep at night.  Reports minimal improvement with bracing      Subjective   Review of Systems:   Review of Systems   All other systems reviewed and are negative.       Pertinent review of systems per HPI    I reviewed the patient's chief complaint, history of present illness, review of systems, past medical history, surgical history, family history, social history, medications and allergy list in the EMR on 04/17/2025 and agree with the findings above.    Objective    Vital Signs:   Vitals:    04/17/25 1135   BP: 95/70   Weight: 64.2 kg (141 lb 8 oz)   Height: 161.9 cm (63.75\")     BMI: Body mass index is 24.48 kg/m².     General Appearance: No acute distress. Alert and oriented.     Chest:  Non-labored breathing on room air      Ortho Exam:  Tinel at the right greater than left carpal tunnel.  Positive right carpal compression test.  No thenar wasting.  Negative Tinel at the bilateral cubital tunnels.  Fingers warm and well-perfused distally  Sensation intact to light touch in the median, radial and ulnar nerve distributions    Imaging/Studies:   Imaging Results (Last 24 Hours)       ** " No results found for the last 24 hours. **            EMG nerve conduction studies from 3/19/2025 were independently reviewed and interpreted myself and demonstrate evidence of moderate right carpal tunnel syndrome.    Procedures:  Procedures    Quality Measures:   ACP:   ACP discussion was deferred.    Tobacco:   Svetlana Rizo  reports that she quit smoking about 17 months ago. Her smoking use included cigarettes. She started smoking about 19 years ago. She has a 8.9 pack-year smoking history. She has been exposed to tobacco smoke. She has never used smokeless tobacco.    Assessment / Plan    Assessment/Plan:      Diagnosis Plan   1. Carpal tunnel syndrome of right wrist            Patient presents for follow-up of right greater than left carpal tunnel syndrome.  She had EMG nerve conduction studies which demonstrate evidence of right moderate carpal tunnel syndrome.  We discussed diagnosis of carpal tunnel syndrome as well as further treatment options including corticosteroid injection versus carpal tunnel release.  The patient would like to proceed with right carpal tunnel injection today.  Recommend patient follow-up as needed    Procedure Note- Carpal Tunnel Injection:    I discussed with the patient the potential benefits of performing therapeutic injections as well as potential risks including but not limited to infection, swelling, pain, bleeding, bruising, nerve/vessel damage, skin color changes, transient elevation in blood glucose levels, and fat atrophy. After informed consent and after the areas were prepped with chlorhexadine soap, ethyl chloride was used to numb the skin. The palmaris as a landmark, 20mg of Kenalog and 0.5 cc of 1% lidocaine was injected into the right carpal tunnel, taking care to avoid injecting directly into the median nerve.  The patient tolerated the procedure well. There were no complications. A sterile dressing was placed over the injection sites.      Follow Up:   Return if  symptoms worsen or fail to improve.        Mariano Lima MD  [facility] Hand and Upper Extremity Surgeon

## 2025-04-17 NOTE — PROGRESS NOTES
Procedure   - Hand/Upper Extremity Injection: R carpal tunnel for carpal tunnel syndrome on 4/17/2025 11:47 AM  Indications: pain  Details: 27 G needle, volar approach  Medications: 20 mg triamcinolone acetonide 40 MG/ML; 0.5 mL lidocaine PF 1% 1 %  Outcome: tolerated well, no immediate complications  Procedure, treatment alternatives, risks and benefits explained, specific risks discussed. Consent was given by the patient. Immediately prior to procedure a time out was called to verify the correct patient, procedure, equipment, support staff and site/side marked as required. Patient was prepped and draped in the usual sterile fashion.

## 2025-07-09 DIAGNOSIS — F43.29 STRESS AND ADJUSTMENT REACTION: ICD-10-CM

## 2025-07-10 RX ORDER — BUSPIRONE HYDROCHLORIDE 5 MG/1
15 TABLET ORAL 2 TIMES DAILY
Qty: 180 TABLET | Refills: 0 | Status: SHIPPED | OUTPATIENT
Start: 2025-07-10

## 2025-07-10 NOTE — TELEPHONE ENCOUNTER
Rx Refill Note    Requested Prescriptions     Pending Prescriptions Disp Refills    busPIRone (BUSPAR) 5 MG tablet 180 tablet 0     Sig: Take 3 tablets by mouth 2 (Two) Times a Day.    FLUoxetine (PROzac) 20 MG capsule 90 capsule 0     Sig: Take 1 capsule by mouth Daily.        Last office visit with prescribing clinician: 3/31/2025      Next office visit with prescribing clinician: 9/30/2025   Last labs:   Last refill: needs   Pharmacy (be sure to add in Epic). correct